# Patient Record
Sex: FEMALE | Race: WHITE | NOT HISPANIC OR LATINO
[De-identification: names, ages, dates, MRNs, and addresses within clinical notes are randomized per-mention and may not be internally consistent; named-entity substitution may affect disease eponyms.]

---

## 2021-03-12 PROBLEM — Z00.00 ENCOUNTER FOR PREVENTIVE HEALTH EXAMINATION: Status: ACTIVE | Noted: 2021-03-12

## 2021-03-17 DIAGNOSIS — I10 ESSENTIAL (PRIMARY) HYPERTENSION: ICD-10-CM

## 2021-03-17 DIAGNOSIS — Z86.39 PERSONAL HISTORY OF OTHER ENDOCRINE, NUTRITIONAL AND METABOLIC DISEASE: ICD-10-CM

## 2021-03-18 ENCOUNTER — APPOINTMENT (OUTPATIENT)
Dept: CARDIOTHORACIC SURGERY | Facility: CLINIC | Age: 86
End: 2021-03-18
Payer: MEDICARE

## 2021-03-18 DIAGNOSIS — G45.9 TRANSIENT CEREBRAL ISCHEMIC ATTACK, UNSPECIFIED: ICD-10-CM

## 2021-03-18 DIAGNOSIS — Z86.79 PERSONAL HISTORY OF OTHER DISEASES OF THE CIRCULATORY SYSTEM: ICD-10-CM

## 2021-03-18 DIAGNOSIS — Z78.9 OTHER SPECIFIED HEALTH STATUS: ICD-10-CM

## 2021-03-18 PROCEDURE — 99205 OFFICE O/P NEW HI 60 MIN: CPT

## 2021-03-19 PROBLEM — Z78.9 CAFFEINE USE: Status: ACTIVE | Noted: 2021-03-19

## 2021-03-19 PROBLEM — Z86.79 HISTORY OF PULMONARY HYPERTENSION: Status: RESOLVED | Noted: 2021-03-19 | Resolved: 2021-03-19

## 2021-03-19 PROBLEM — Z86.79 HISTORY OF MITRAL VALVE INSUFFICIENCY: Status: RESOLVED | Noted: 2021-03-19 | Resolved: 2021-03-19

## 2021-03-19 PROBLEM — Z86.79 HISTORY OF CARDIAC MURMUR: Status: RESOLVED | Noted: 2021-03-19 | Resolved: 2021-03-19

## 2021-03-19 PROBLEM — G45.9 TRANSIENT ISCHEMIC ATTACK (TIA): Status: RESOLVED | Noted: 2021-03-19 | Resolved: 2021-03-19

## 2021-03-19 NOTE — REVIEW OF SYSTEMS
[SOB on Exertion] : shortness of breath during exertion [Abdominal Pain] : abdominal pain [Fever] : no fever [Chills] : no chills [Feeling Poorly] : not feeling poorly [Feeling Tired] : feeling tired [Recent Weight Gain (___ Lbs)] : no recent weight gain [Recent Weight Loss (___ Lbs)] : no recent weight loss [Earache] : no earache [Loss Of Hearing] : hearing loss [Nosebleeds] : no nosebleeds [Nasal Discharge] : no nasal discharge [Sore Throat] : no sore throat [Hoarseness] : no hoarseness [Shortness Of Breath] : shortness of breath [Wheezing] : no wheezing [Cough] : no cough [Orthopnea] : no orthopnea [PND] : no PND [Arthralgias] : arthralgias [Joint Pain] : no joint pain [Joint Swelling] : no joint swelling [Joint Stiffness] : no joint stiffness [Limb Pain] : no limb pain [Limb Swelling] : no limb swelling [Negative] : Heme/Lymph [FreeTextEntry7] : gas pain

## 2021-03-19 NOTE — ASSESSMENT
[FreeTextEntry1] : I had the pleasure of evaluating  James Vazquez . Today I have  discussed (Echocardiogram Report from 2.4.21 from [facility] that revealed  a diagnosis Severe  Aortic Stenosis. Today she reports  shortness of breath with climbing up/down stairs or with exertion and gas pain that resolves on its own. She denies dizziness, unintentional weight loss, cough, fever or chills. \par \par Recent 2.4.21   Echocardiogram Report from [facility] revealed  a diagnosis Severe  Aortic Stenosis. Values from echocardiogram  report  showed a pGr 51 mmHg, mGr 28.4  mmHg,  JENNIFER VTI 0.41cm2, and Dimension index 0.13.  The patient's past medical history, past surgical history, family history, social history, allergies, medications, and multisystem review of systems were individually reviewed with the patient. \par \par The patient will need a full workup to confirm the severity of her aortic valve stenosis. I suspect she has low flow, low gradient severe aortic valve stenosis. Cardiac catheterization will be ordered with Dr. Briggs.  i asked him to cross the valve to obtain direct pressure gradients. While in the Cath Lab recovery room we will perform a repeat transthoracic echocardiogram, and carotid duplex. 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 Risks discussed included, but not limited to infection, bleeding, myocardial infarction, cerebrovascular accident, renal failure, vascular injury requiring intervention, cardiac rupture, and death. In addition, a roughly 5-10% risk of permanent pacemaker requirement was highlighted.\par \par \par \par PLAN: \par

## 2021-03-19 NOTE — PHYSICAL EXAM
[General Appearance - Alert] : alert [General Appearance - In No Acute Distress] : in no acute distress [Sclera] : the sclera and conjunctiva were normal [PERRL With Normal Accommodation] : pupils were equal in size, round, and reactive to light [Extraocular Movements] : extraocular movements were intact [Outer Ear] : the ears and nose were normal in appearance [Oropharynx] : the oropharynx was normal [Neck Appearance] : the appearance of the neck was normal [Neck Cervical Mass (___cm)] : no neck mass was observed [Jugular Venous Distention Increased] : there was no jugular-venous distention [Thyroid Diffuse Enlargement] : the thyroid was not enlarged [Thyroid Nodule] : there were no palpable thyroid nodules [Auscultation Breath Sounds / Voice Sounds] : lungs were clear to auscultation bilaterally [Apical Impulse] : the apical impulse was normal [Heart Rate And Rhythm] : heart rate was normal and rhythm regular [Heart Sounds Gallop] : no gallops [Heart Sounds Pericardial Friction Rub] : no pericardial rub [FreeTextEntry1] : Normal S1, absent S2, harsh systolic murmur cardiac base [Examination Of The Chest] : the chest was normal in appearance [Chest Visual Inspection Thoracic Asymmetry] : no chest asymmetry [Diminished Respiratory Excursion] : normal chest expansion [Varicose Veins Of The Right Leg] : the patient has no varicose veins of the right leg [Varicose Veins Of The Left Leg] : the patient has no varicose veins of the left leg [Bowel Sounds] : normal bowel sounds [Abdomen Soft] : soft [Abdomen Tenderness] : non-tender [Abdomen Mass (___ Cm)] : no abdominal mass palpated [Cervical Lymph Nodes Enlarged Posterior Bilaterally] : posterior cervical [Cervical Lymph Nodes Enlarged Anterior Bilaterally] : anterior cervical [Supraclavicular Lymph Nodes Enlarged Bilaterally] : supraclavicular [Axillary Lymph Nodes Enlarged Bilaterally] : axillary [Femoral Lymph Nodes Enlarged Bilaterally] : femoral [Inguinal Lymph Nodes Enlarged Bilaterally] : inguinal [No CVA Tenderness] : no ~M costovertebral angle tenderness [No Spinal Tenderness] : no spinal tenderness [Abnormal Walk] : normal gait [Nail Clubbing] : no clubbing  or cyanosis of the fingernails [Musculoskeletal - Swelling] : no joint swelling seen [Motor Tone] : muscle strength and tone were normal [Skin Color & Pigmentation] : normal skin color and pigmentation [Skin Turgor] : normal skin turgor [] : no rash [Deep Tendon Reflexes (DTR)] : deep tendon reflexes were 2+ and symmetric [Sensation] : the sensory exam was normal to light touch and pinprick [No Focal Deficits] : no focal deficits

## 2021-03-19 NOTE — DATA REVIEWED
[FreeTextEntry1] : \par 2.4.21   Echocardiogram Report from 2.4.21 from [facility] that revealed  a diagnosis Severe  Aortic Stenosis. Values from echocardiogram  report  showed a pGr 51 mmHg, mGr 28.4  mmHg, and JENNIFER VTI 0.41cm2, Dimension index 0.13\par \par -Normal global and regional LV systolic function \par -The LVEF 57% by Mod Reyna \par - Mild left ventricular basal septa; hypertrophy consistent  with a sigmoid septum \par -The left ventricular cavity size is decreased \par -Calcified mitral valve \par -Moderate mitral regurgitation \par -The aortic valve is trileaflet  calcified and has reduced cusp separation \par -There is severe aortic stenosis \par -The tricuspid valve is thickened \par -Mild to moderate tricuspid regurgitation \par -There is mild pulmonary  hypertension by right ventricular systolic pressure \par \par 12.7.2010 [facility] Nuclear Stress Test \par - Normal ejection fraction and no ischemia on SPECT imaging\par \par \par \par \par \par

## 2021-03-19 NOTE — HISTORY OF PRESENT ILLNESS
[FreeTextEntry1] : Ms. MENDOZA is a 89 year old female referred by  for an initial evaluation for a TAVR consult.  Her past medical history includes aortic stenosis, severe, benign essential hypertension,cardiac murmur, hyperlipidemia, mitral valve insufficiency, pulmonary hypertension,Transient ischemic attack (TIA), left breast cancer. \par \par Today she reports  shortness of breath with climbing up/down stairs or with exertion and gas pain that resolves on its own. She denies dizziness, unintentional weight loss, cough, fever or chills. Recent 2.4.21   Echocardiogram Report from   [facility] revealed  a diagnosis Severe  Aortic Stenosis. Values from echocardiogram  report  showed a pGr 51 mmHg, mGr 28.4  mmHg,  JENNIFER VTI 0.41cm2, and Dimension index 0.13.  She is here to discuss TAVR candidacy. \par \par The patient's past medical history, past surgical history, family history, social history, allergies, medications, and multisystem review of systems were individually reviewed with the patient.  There are no pertinent additions or subtractions.  The patient was personally seen and examined.\par \par

## 2021-03-19 NOTE — CONSULT LETTER
[Dear  ___] : Dear  [unfilled], [Consult Letter:] : I had the pleasure of evaluating your patient, [unfilled]. [Please see my note below.] : Please see my note below. [Consult Closing:] : Thank you very much for allowing me to participate in the care of this patient.  If you have any questions, please do not hesitate to contact me. [Sincerely,] : Sincerely, [FreeTextEntry2] : Dr. Briggs  [FreeTextEntry3] : Yasmany Jimenez MD\par Chief of Cardiovascular and Thoracic Surgery\par System Director of Endovascular and Cardiovascular Surgery\par  \par Cardiovascular and Thoracic Medicine\par [employer] [facility] [employer]\par [facility] \par [address] [place] [address]\[place] [address]\par T: [phone]\par F: [phone]\par \par

## 2021-04-27 ENCOUNTER — APPOINTMENT (OUTPATIENT)
Dept: DISASTER EMERGENCY | Facility: CLINIC | Age: 86
End: 2021-04-27

## 2021-05-11 ENCOUNTER — APPOINTMENT (OUTPATIENT)
Dept: DISASTER EMERGENCY | Facility: CLINIC | Age: 86
End: 2021-05-11

## 2021-05-12 LAB — SARS-COV-2 N GENE NPH QL NAA+PROBE: NOT DETECTED

## 2021-05-13 RX ORDER — CHLORHEXIDINE GLUCONATE 213 G/1000ML
1 SOLUTION TOPICAL ONCE
Refills: 0 | Status: DISCONTINUED | OUTPATIENT
Start: 2021-05-14 | End: 2021-05-28

## 2021-05-13 NOTE — H&P PST ADULT - ASSESSMENT
89 y/o F with PMH HTN, valvular disease(aortic and mitral) who is experiencing worsening KNUTSON. She presents today for Right and left heart catheterization with Dr Briggs for possible operative planning for TAVR    NPO for procedure  Consent to be obtained by MD prior to procedure   89 y/o F with PMH HTN, valvular disease(aortic and mitral) who is experiencing worsening KNUTSON. She presents today for Right and left heart catheterization with Dr Briggs for possible operative planning for TAVR    NPO for procedure  Consent to be obtained by MD prior to procedure  Pre procedure hydration

## 2021-05-13 NOTE — H&P PST ADULT - NSICDXPASTMEDICALHX_GEN_ALL_CORE_FT
PAST MEDICAL HISTORY:  Aortic stenosis     Breast cancer     HLD (hyperlipidemia)     HTN (hypertension)     Mitral regurgitation     TIA (transient ischemic attack)

## 2021-05-13 NOTE — H&P PST ADULT - HISTORY OF PRESENT ILLNESS
89 y/o F with PMH HTN, valvular disease(aortic and mitral) who is experiencing worsening KNUTSON. She presents today for Right and left heart catheterization with Dr Briggs for possible operative planning for TAVR      ASA  Mallampati  GFR  BRA  Cr  Covid 5/11/21 negative    Symptoms:        Angina (Class):        Ischemic Symptoms: KNUTSON    Heart Failure:        Systolic/Diastolic/Combined:        NYHA Class (within 2 weeks):     Assessment of LVEF (Must be within 6 months):       EF: 57%       Assessed by: Echo       Date: 2/4/21    Prior Cardiac Interventions (LHC, stents, CABG):       PCI's (Date, Stents, Vessels): NA       CABG (Date, Grafts): NA    Echo (Date, Findings): 2/4/21 normal LV function, mod MR, severe aortic stenosis, mild-mod TR, mild pulmonary HTM    Antianginal Therapies:        Beta Blockers:         Calcium Channel Blockers:        Long Acting Nitrates:        Ranexa:     Associated Risk Factors:        Cerebrovascular Disease: +TIA       Chronic Lung Disease: N/A       Peripheral Arterial Disease: N/A       Chronic Kidney Disease (if yes, what is GFR): N/A       Uncontrolled Diabetes (if yes, what is HgbA1C or FBS): N/A       Poorly Controlled Hypertension (if yes, what is SBP): N/A       Morbid Obesity (if yes, what is BMI): N/A       History of Recent Ventricular Arrhythmia: N/A       Inability to Ambulate Safely: N/A       Need for Therapeutic Anticoagulation: N/A       Antiplatelet or Contrast Allergy: N/A 89 y/o F with PMH HTN, valvular disease(aortic and mitral) who is experiencing worsening KNUTSON. She presents today for Right and left heart catheterization with Dr Briggs for possible operative planning for TAVR      ASA  2  Mallampati  2  GFR  BRA  Cr  Covid 5/11/21 negative    Symptoms:        Angina (Class): III       Ischemic Symptoms: KNUTSON    Heart Failure:        Systolic/Diastolic/Combined: NA       NYHA Class (within 2 weeks):     Assessment of LVEF (Must be within 6 months):       EF: 57%       Assessed by: Echo       Date: 2/4/21    Prior Cardiac Interventions (LHC, stents, CABG):       PCI's (Date, Stents, Vessels): NA       CABG (Date, Grafts): NA    Echo (Date, Findings): 2/4/21 normal LV function, mod MR, severe aortic stenosis, mild-mod TR, mild pulmonary HTM    Antianginal Therapies:        Beta Blockers:  Metoprolol 12.5 mg qd       Calcium Channel Blockers:        Long Acting Nitrates:        Ranexa:     Associated Risk Factors:        Cerebrovascular Disease: +TIA       Chronic Lung Disease: N/A       Peripheral Arterial Disease: N/A       Chronic Kidney Disease (if yes, what is GFR): N/A       Uncontrolled Diabetes (if yes, what is HgbA1C or FBS): N/A       Poorly Controlled Hypertension (if yes, what is SBP): N/A       Morbid Obesity (if yes, what is BMI): N/A       History of Recent Ventricular Arrhythmia: N/A       Inability to Ambulate Safely: N/A       Need for Therapeutic Anticoagulation: N/A       Antiplatelet or Contrast Allergy: N/A 89 y/o F with PMH HTN, valvular disease(aortic and mitral) who is experiencing worsening KNUTSON. She presents today for Right and left heart catheterization with Dr Birggs for possible operative planning for TAVR      ASA  2  Mallampati  2  GFR  66  BRA  3.6%  Cr  0.76  Covid 5/11/21 negative    Symptoms:        Angina (Class): III       Ischemic Symptoms: KNUTSON    Heart Failure:        Systolic/Diastolic/Combined: NA       NYHA Class (within 2 weeks):     Assessment of LVEF (Must be within 6 months):       EF: 57%       Assessed by: Echo       Date: 2/4/21    Prior Cardiac Interventions (LHC, stents, CABG):       PCI's (Date, Stents, Vessels): NA       CABG (Date, Grafts): NA    Echo (Date, Findings): 2/4/21 normal LV function, mod MR, severe aortic stenosis, mild-mod TR, mild pulmonary HTM    Antianginal Therapies:        Beta Blockers:  Metoprolol 12.5 mg qd       Calcium Channel Blockers:        Long Acting Nitrates:        Ranexa:     Associated Risk Factors:        Cerebrovascular Disease: +TIA       Chronic Lung Disease: N/A       Peripheral Arterial Disease: N/A       Chronic Kidney Disease (if yes, what is GFR): N/A       Uncontrolled Diabetes (if yes, what is HgbA1C or FBS): N/A       Poorly Controlled Hypertension (if yes, what is SBP): N/A       Morbid Obesity (if yes, what is BMI): N/A       History of Recent Ventricular Arrhythmia: N/A       Inability to Ambulate Safely: N/A       Need for Therapeutic Anticoagulation: N/A       Antiplatelet or Contrast Allergy: N/A

## 2021-05-14 ENCOUNTER — TRANSCRIPTION ENCOUNTER (OUTPATIENT)
Age: 86
End: 2021-05-14

## 2021-05-14 ENCOUNTER — OUTPATIENT (OUTPATIENT)
Dept: OUTPATIENT SERVICES | Facility: HOSPITAL | Age: 86
LOS: 1 days | Discharge: HOME | End: 2021-05-14
Payer: MEDICARE

## 2021-05-14 VITALS
HEART RATE: 71 BPM | TEMPERATURE: 98 F | RESPIRATION RATE: 16 BRPM | OXYGEN SATURATION: 97 % | HEIGHT: 62 IN | DIASTOLIC BLOOD PRESSURE: 79 MMHG | WEIGHT: 104.06 LBS | SYSTOLIC BLOOD PRESSURE: 157 MMHG

## 2021-05-14 VITALS
RESPIRATION RATE: 15 BRPM | HEART RATE: 74 BPM | OXYGEN SATURATION: 94 % | SYSTOLIC BLOOD PRESSURE: 139 MMHG | DIASTOLIC BLOOD PRESSURE: 65 MMHG

## 2021-05-14 DIAGNOSIS — Z90.12 ACQUIRED ABSENCE OF LEFT BREAST AND NIPPLE: Chronic | ICD-10-CM

## 2021-05-14 DIAGNOSIS — I35.0 NONRHEUMATIC AORTIC (VALVE) STENOSIS: ICD-10-CM

## 2021-05-14 LAB
ANION GAP SERPL CALC-SCNC: 10 MMOL/L — SIGNIFICANT CHANGE UP (ref 5–17)
BASOPHILS # BLD AUTO: 0.04 K/UL — SIGNIFICANT CHANGE UP (ref 0–0.2)
BASOPHILS NFR BLD AUTO: 0.4 % — SIGNIFICANT CHANGE UP (ref 0–2)
BUN SERPL-MCNC: 21 MG/DL — HIGH (ref 8–20)
CALCIUM SERPL-MCNC: 10 MG/DL — SIGNIFICANT CHANGE UP (ref 8.6–10.2)
CHLORIDE SERPL-SCNC: 103 MMOL/L — SIGNIFICANT CHANGE UP (ref 98–107)
CO2 SERPL-SCNC: 25 MMOL/L — SIGNIFICANT CHANGE UP (ref 22–29)
CREAT SERPL-MCNC: 0.76 MG/DL — SIGNIFICANT CHANGE UP (ref 0.5–1.3)
EOSINOPHIL # BLD AUTO: 0.04 K/UL — SIGNIFICANT CHANGE UP (ref 0–0.5)
EOSINOPHIL NFR BLD AUTO: 0.4 % — SIGNIFICANT CHANGE UP (ref 0–6)
GLUCOSE SERPL-MCNC: 121 MG/DL — HIGH (ref 70–99)
HCT VFR BLD CALC: 37.9 % — SIGNIFICANT CHANGE UP (ref 34.5–45)
HGB BLD-MCNC: 12.7 G/DL — SIGNIFICANT CHANGE UP (ref 11.5–15.5)
IMM GRANULOCYTES NFR BLD AUTO: 0.4 % — SIGNIFICANT CHANGE UP (ref 0–1.5)
LYMPHOCYTES # BLD AUTO: 2.78 K/UL — SIGNIFICANT CHANGE UP (ref 1–3.3)
LYMPHOCYTES # BLD AUTO: 28.7 % — SIGNIFICANT CHANGE UP (ref 13–44)
MCHC RBC-ENTMCNC: 28.7 PG — SIGNIFICANT CHANGE UP (ref 27–34)
MCHC RBC-ENTMCNC: 33.5 GM/DL — SIGNIFICANT CHANGE UP (ref 32–36)
MCV RBC AUTO: 85.7 FL — SIGNIFICANT CHANGE UP (ref 80–100)
MONOCYTES # BLD AUTO: 0.8 K/UL — SIGNIFICANT CHANGE UP (ref 0–0.9)
MONOCYTES NFR BLD AUTO: 8.3 % — SIGNIFICANT CHANGE UP (ref 2–14)
NEUTROPHILS # BLD AUTO: 5.99 K/UL — SIGNIFICANT CHANGE UP (ref 1.8–7.4)
NEUTROPHILS NFR BLD AUTO: 61.8 % — SIGNIFICANT CHANGE UP (ref 43–77)
PLATELET # BLD AUTO: 261 K/UL — SIGNIFICANT CHANGE UP (ref 150–400)
POTASSIUM SERPL-MCNC: 4.5 MMOL/L — SIGNIFICANT CHANGE UP (ref 3.5–5.3)
POTASSIUM SERPL-SCNC: 4.5 MMOL/L — SIGNIFICANT CHANGE UP (ref 3.5–5.3)
RBC # BLD: 4.42 M/UL — SIGNIFICANT CHANGE UP (ref 3.8–5.2)
RBC # FLD: 13.6 % — SIGNIFICANT CHANGE UP (ref 10.3–14.5)
SODIUM SERPL-SCNC: 138 MMOL/L — SIGNIFICANT CHANGE UP (ref 135–145)
WBC # BLD: 9.69 K/UL — SIGNIFICANT CHANGE UP (ref 3.8–10.5)
WBC # FLD AUTO: 9.69 K/UL — SIGNIFICANT CHANGE UP (ref 3.8–10.5)

## 2021-05-14 PROCEDURE — C1769: CPT

## 2021-05-14 PROCEDURE — 85025 COMPLETE CBC W/AUTO DIFF WBC: CPT

## 2021-05-14 PROCEDURE — 93458 L HRT ARTERY/VENTRICLE ANGIO: CPT

## 2021-05-14 PROCEDURE — 99152 MOD SED SAME PHYS/QHP 5/>YRS: CPT

## 2021-05-14 PROCEDURE — 93010 ELECTROCARDIOGRAM REPORT: CPT

## 2021-05-14 PROCEDURE — 99153 MOD SED SAME PHYS/QHP EA: CPT

## 2021-05-14 PROCEDURE — 36415 COLL VENOUS BLD VENIPUNCTURE: CPT

## 2021-05-14 PROCEDURE — 93005 ELECTROCARDIOGRAM TRACING: CPT

## 2021-05-14 PROCEDURE — C1894: CPT

## 2021-05-14 PROCEDURE — C1887: CPT

## 2021-05-14 PROCEDURE — 80048 BASIC METABOLIC PNL TOTAL CA: CPT

## 2021-05-14 PROCEDURE — C1889: CPT

## 2021-05-14 RX ORDER — SODIUM CHLORIDE 9 MG/ML
1000 INJECTION INTRAMUSCULAR; INTRAVENOUS; SUBCUTANEOUS
Refills: 0 | Status: DISCONTINUED | OUTPATIENT
Start: 2021-05-14 | End: 2021-05-28

## 2021-05-14 RX ORDER — METOPROLOL TARTRATE 50 MG
1 TABLET ORAL
Qty: 30 | Refills: 1
Start: 2021-05-14

## 2021-05-14 RX ADMIN — SODIUM CHLORIDE 300 MILLILITER(S): 9 INJECTION INTRAMUSCULAR; INTRAVENOUS; SUBCUTANEOUS at 08:32

## 2021-05-14 RX ADMIN — SODIUM CHLORIDE 200 MILLILITER(S): 9 INJECTION INTRAMUSCULAR; INTRAVENOUS; SUBCUTANEOUS at 11:02

## 2021-05-14 NOTE — ASU PATIENT PROFILE, ADULT - PMH
Aortic stenosis    Breast cancer    HLD (hyperlipidemia)    HTN (hypertension)    Mitral regurgitation    TIA (transient ischemic attack)

## 2021-05-14 NOTE — DISCHARGE NOTE PROVIDER - CARE PROVIDER_API CALL
Jayce Baird  [id number]  [address]  [place] [address]  Phone: ([phone]  Fax: ()-  Follow Up Time:     Yasmany Jimenez)  Surgery; Thoracic and Cardiac Surgery  [address] [place] [address]  Phone: [phone]  Fax: [phone]  Follow Up Time:

## 2021-05-14 NOTE — PROGRESS NOTE ADULT - SUBJECTIVE AND OBJECTIVE BOX
Department of Cardiology                                                                  [facility]/[place] [address] [address] [facility]                                                            Telephone: [phone]. Fax:[phone]                                                    Post- Procedure Note: Left Heart Cardiac Catheterization       HPI:  89 y/o F with PMH HTN, valvular disease(aortic and mitral) who is experiencing worsening KNUTSON. She presents today for Right and left heart catheterization with Dr Briggs for possible operative planning for TAVR  Now s/p right and left heart catheterization via right radial artery and right femoral vein approach with Dr. Briggs:RCA and LAD disease with critical AS, PCWP 20.(Verbal report)  Pt tolerated procedure well    Medications:  Versed 0.5 mg  Fentanyl 25 mcg  Heparin 4000 units         PAST MEDICAL & SURGICAL HISTORY:  HTN (hypertension)    HLD (hyperlipidemia)    TIA (transient ischemic attack)    Aortic stenosis    Mitral regurgitation    Breast cancer    H/O left mastectomy      FAMILY HISTORY:    Home Medications:  clopidogrel 75 mg oral tablet: 1 tab(s) orally once a day (14 May 2021 08:24)  Metoprolol Tartrate 25 mg oral tablet: 1/2  tab(s) orally 2 times a day (14 May 2021 08:24)  simvastatin 20 mg oral tablet: 1 tab(s) orally once a day (at bedtime) (14 May 2021 08:24)    Objective:  Vital Signs Last 24 Hrs  T(C): 36.5 (14 May 2021 08:25), Max: 36.5 (14 May 2021 08:25)  T(F): 97.7 (14 May 2021 08:25), Max: 97.7 (14 May 2021 08:25)  HR: 71 (14 May 2021 08:25) (71 - 71)  BP: 157/79 (14 May 2021 08:25) (157/79 - 157/79)  BP(mean): --  RR: 16 (14 May 2021 08:25) (16 - 16)  SpO2: 97% (14 May 2021 08:25) (97% - 97%)    CM: SR  Neuro: A&OX3, CN 2-12 intact  HEENT: NC, AT  Lungs: CTA B/L  CV: S1, S2, no murmur, RRR  Abd: Soft  Right Groin with #4Fr V sheath: Soft, no bleeding, no hematoma; R radial band in place; soft, no bleeding, no hematoma. R hand without motor or sensory deficits  Extremity: + distal pulses                        12.7   9.69  )-----------( 261      ( 14 May 2021 08:19 )             37.9     05-14    138  |  103  |  21.0<H>  ----------------------------<  121<H>  4.5   |  25.0  |  0.76    Ca    10.0      14 May 2021 08:19      89 y/o F with PMH HTN, valvular disease(aortic and mitral) who is experiencing worsening KNUTSON. She presents today for Right and left heart catheterization with Dr Briggs for possible operative planning for TAVR  Now s/p right and left heart catheterization via right radial artery and right femoral vein approach with Dr. Briggs:RCA and LAD disease with critical AS, PCWP 20.(Verbal report)  Pt tolerated procedure well    -post cardiac cath orders  -radial and groin precautions  -bedrest x 2  hours post procedure  -Post procedure hydration  -continue plavix   -statin therapy  -change beta blocker to Toprol 25 mg qhs per Dr Briggs  -follow up outpt in 2 weeks with Cardiologist(Dr Baidr) and CTS(Dr Jimenez)  -Discharge later today

## 2021-05-14 NOTE — DISCHARGE NOTE PROVIDER - HOSPITAL COURSE
89 y/o F with PMH HTN, valvular disease(aortic and mitral) who is experiencing worsening KNUTSON. She presents today for Right and left heart catheterization with Dr Briggs for possible operative planning for TAVR  Now s/p right and left heart catheterization via right radial artery and right femoral vein approach with Dr. Briggs:RCA and LAD disease with critical AS, PCWP 20.(Verbal report)  Pt tolerated procedure well  Pt ambulating, voided and tolerated diet.  She is stable for discharge

## 2021-05-14 NOTE — DISCHARGE NOTE NURSING/CASE MANAGEMENT/SOCIAL WORK - PATIENT PORTAL LINK FT
You can access the FollowMyHealth Patient Portal offered by [facility] by registering at the following website: [email or web address]. By joining [employer]’s FollowMyHealth portal, you will also be able to view your health information using other applications (apps) compatible with our system.

## 2021-05-14 NOTE — DISCHARGE NOTE PROVIDER - NSDCMRMEDTOKEN_GEN_ALL_CORE_FT
clopidogrel 75 mg oral tablet: 1 tab(s) orally once a day  metoprolol succinate 25 mg oral capsule, extended release: 1 tab(s) orally once a day (at bedtime)  simvastatin 20 mg oral tablet: 1 tab(s) orally once a day (at bedtime)

## 2021-05-14 NOTE — DISCHARGE NOTE PROVIDER - NSDCCPCAREPLAN_GEN_ALL_CORE_FT
PRINCIPAL DISCHARGE DIAGNOSIS  Diagnosis: Aortic stenosis  Assessment and Plan of Treatment:       SECONDARY DISCHARGE DIAGNOSES  Diagnosis: CAD (coronary atherosclerotic disease)  Assessment and Plan of Treatment:     Diagnosis: HLD (hyperlipidemia)  Assessment and Plan of Treatment:

## 2021-05-14 NOTE — DISCHARGE NOTE PROVIDER - NSDCCPTREATMENT_GEN_ALL_CORE_FT
PRINCIPAL PROCEDURE  Procedure: Left and right heart catheterization  Findings and Treatment: RCA and LAD disease with severe aortic stenosis

## 2021-06-05 PROBLEM — I34.0 NONRHEUMATIC MITRAL (VALVE) INSUFFICIENCY: Chronic | Status: ACTIVE | Noted: 2021-05-13

## 2021-06-05 PROBLEM — I10 ESSENTIAL (PRIMARY) HYPERTENSION: Chronic | Status: ACTIVE | Noted: 2021-05-13

## 2021-06-05 PROBLEM — E78.5 HYPERLIPIDEMIA, UNSPECIFIED: Chronic | Status: ACTIVE | Noted: 2021-05-13

## 2021-06-05 PROBLEM — G45.9 TRANSIENT CEREBRAL ISCHEMIC ATTACK, UNSPECIFIED: Chronic | Status: ACTIVE | Noted: 2021-05-13

## 2021-06-05 PROBLEM — I35.0 NONRHEUMATIC AORTIC (VALVE) STENOSIS: Chronic | Status: ACTIVE | Noted: 2021-05-13

## 2021-06-22 ENCOUNTER — OUTPATIENT (OUTPATIENT)
Dept: OUTPATIENT SERVICES | Facility: HOSPITAL | Age: 86
LOS: 1 days | End: 2021-06-22
Payer: MEDICARE

## 2021-06-22 ENCOUNTER — RESULT REVIEW (OUTPATIENT)
Age: 86
End: 2021-06-22

## 2021-06-22 DIAGNOSIS — Z90.12 ACQUIRED ABSENCE OF LEFT BREAST AND NIPPLE: Chronic | ICD-10-CM

## 2021-06-22 DIAGNOSIS — I35.0 NONRHEUMATIC AORTIC (VALVE) STENOSIS: ICD-10-CM

## 2021-06-22 PROCEDURE — 74174 CTA ABD&PLVS W/CONTRAST: CPT

## 2021-06-22 PROCEDURE — 93880 EXTRACRANIAL BILAT STUDY: CPT | Mod: 26

## 2021-06-22 PROCEDURE — 93306 TTE W/DOPPLER COMPLETE: CPT

## 2021-06-22 PROCEDURE — 74174 CTA ABD&PLVS W/CONTRAST: CPT | Mod: 26,MH

## 2021-06-22 PROCEDURE — 75574 CT ANGIO HRT W/3D IMAGE: CPT

## 2021-06-22 PROCEDURE — 75574 CT ANGIO HRT W/3D IMAGE: CPT | Mod: 26,MH

## 2021-06-22 PROCEDURE — 93306 TTE W/DOPPLER COMPLETE: CPT | Mod: 26

## 2021-06-22 PROCEDURE — 93880 EXTRACRANIAL BILAT STUDY: CPT

## 2021-06-30 DIAGNOSIS — Z23 ENCOUNTER FOR IMMUNIZATION: ICD-10-CM

## 2021-07-01 ENCOUNTER — NON-APPOINTMENT (OUTPATIENT)
Age: 86
End: 2021-07-01

## 2021-07-08 ENCOUNTER — OUTPATIENT (OUTPATIENT)
Dept: OUTPATIENT SERVICES | Facility: HOSPITAL | Age: 86
LOS: 1 days | End: 2021-07-08
Payer: MEDICARE

## 2021-07-08 ENCOUNTER — RESULT REVIEW (OUTPATIENT)
Age: 86
End: 2021-07-08

## 2021-07-08 VITALS
HEIGHT: 61 IN | SYSTOLIC BLOOD PRESSURE: 140 MMHG | WEIGHT: 106.7 LBS | RESPIRATION RATE: 16 BRPM | HEART RATE: 68 BPM | DIASTOLIC BLOOD PRESSURE: 82 MMHG | TEMPERATURE: 98 F

## 2021-07-08 DIAGNOSIS — Z01.818 ENCOUNTER FOR OTHER PREPROCEDURAL EXAMINATION: ICD-10-CM

## 2021-07-08 DIAGNOSIS — Z98.49 CATARACT EXTRACTION STATUS, UNSPECIFIED EYE: Chronic | ICD-10-CM

## 2021-07-08 DIAGNOSIS — Z29.9 ENCOUNTER FOR PROPHYLACTIC MEASURES, UNSPECIFIED: ICD-10-CM

## 2021-07-08 DIAGNOSIS — E78.5 HYPERLIPIDEMIA, UNSPECIFIED: ICD-10-CM

## 2021-07-08 DIAGNOSIS — Z90.12 ACQUIRED ABSENCE OF LEFT BREAST AND NIPPLE: Chronic | ICD-10-CM

## 2021-07-08 DIAGNOSIS — I35.0 NONRHEUMATIC AORTIC (VALVE) STENOSIS: ICD-10-CM

## 2021-07-08 DIAGNOSIS — I10 ESSENTIAL (PRIMARY) HYPERTENSION: ICD-10-CM

## 2021-07-08 LAB
A1C WITH ESTIMATED AVERAGE GLUCOSE RESULT: 5.9 % — HIGH (ref 4–5.6)
ALBUMIN SERPL ELPH-MCNC: 4.3 G/DL — SIGNIFICANT CHANGE UP (ref 3.3–5.2)
ALP SERPL-CCNC: 108 U/L — SIGNIFICANT CHANGE UP (ref 40–120)
ALT FLD-CCNC: 22 U/L — SIGNIFICANT CHANGE UP
ANION GAP SERPL CALC-SCNC: 13 MMOL/L — SIGNIFICANT CHANGE UP (ref 5–17)
APPEARANCE UR: CLEAR — SIGNIFICANT CHANGE UP
APTT BLD: 30 SEC — SIGNIFICANT CHANGE UP (ref 27.5–35.5)
AST SERPL-CCNC: 24 U/L — SIGNIFICANT CHANGE UP
BACTERIA # UR AUTO: ABNORMAL
BASOPHILS # BLD AUTO: 0.03 K/UL — SIGNIFICANT CHANGE UP (ref 0–0.2)
BASOPHILS NFR BLD AUTO: 0.4 % — SIGNIFICANT CHANGE UP (ref 0–2)
BILIRUB SERPL-MCNC: 0.4 MG/DL — SIGNIFICANT CHANGE UP (ref 0.4–2)
BILIRUB UR-MCNC: NEGATIVE — SIGNIFICANT CHANGE UP
BLD GP AB SCN SERPL QL: SIGNIFICANT CHANGE UP
BUN SERPL-MCNC: 19.4 MG/DL — SIGNIFICANT CHANGE UP (ref 8–20)
CALCIUM SERPL-MCNC: 9.8 MG/DL — SIGNIFICANT CHANGE UP (ref 8.6–10.2)
CHLORIDE SERPL-SCNC: 100 MMOL/L — SIGNIFICANT CHANGE UP (ref 98–107)
CO2 SERPL-SCNC: 27 MMOL/L — SIGNIFICANT CHANGE UP (ref 22–29)
COLOR SPEC: YELLOW — SIGNIFICANT CHANGE UP
CREAT SERPL-MCNC: 0.72 MG/DL — SIGNIFICANT CHANGE UP (ref 0.5–1.3)
DIFF PNL FLD: ABNORMAL
EOSINOPHIL # BLD AUTO: 0.01 K/UL — SIGNIFICANT CHANGE UP (ref 0–0.5)
EOSINOPHIL NFR BLD AUTO: 0.1 % — SIGNIFICANT CHANGE UP (ref 0–6)
EPI CELLS # UR: SIGNIFICANT CHANGE UP
ESTIMATED AVERAGE GLUCOSE: 123 MG/DL — HIGH (ref 68–114)
GLUCOSE SERPL-MCNC: 100 MG/DL — HIGH (ref 70–99)
GLUCOSE UR QL: NEGATIVE MG/DL — SIGNIFICANT CHANGE UP
HCT VFR BLD CALC: 39.5 % — SIGNIFICANT CHANGE UP (ref 34.5–45)
HGB BLD-MCNC: 12.8 G/DL — SIGNIFICANT CHANGE UP (ref 11.5–15.5)
IMM GRANULOCYTES NFR BLD AUTO: 0.4 % — SIGNIFICANT CHANGE UP (ref 0–1.5)
INR BLD: 1.06 RATIO — SIGNIFICANT CHANGE UP (ref 0.88–1.16)
KETONES UR-MCNC: NEGATIVE — SIGNIFICANT CHANGE UP
LEUKOCYTE ESTERASE UR-ACNC: NEGATIVE — SIGNIFICANT CHANGE UP
LYMPHOCYTES # BLD AUTO: 1.35 K/UL — SIGNIFICANT CHANGE UP (ref 1–3.3)
LYMPHOCYTES # BLD AUTO: 18 % — SIGNIFICANT CHANGE UP (ref 13–44)
MCHC RBC-ENTMCNC: 28.4 PG — SIGNIFICANT CHANGE UP (ref 27–34)
MCHC RBC-ENTMCNC: 32.4 GM/DL — SIGNIFICANT CHANGE UP (ref 32–36)
MCV RBC AUTO: 87.8 FL — SIGNIFICANT CHANGE UP (ref 80–100)
MONOCYTES # BLD AUTO: 0.58 K/UL — SIGNIFICANT CHANGE UP (ref 0–0.9)
MONOCYTES NFR BLD AUTO: 7.7 % — SIGNIFICANT CHANGE UP (ref 2–14)
MRSA PCR RESULT.: SIGNIFICANT CHANGE UP
NEUTROPHILS # BLD AUTO: 5.52 K/UL — SIGNIFICANT CHANGE UP (ref 1.8–7.4)
NEUTROPHILS NFR BLD AUTO: 73.4 % — SIGNIFICANT CHANGE UP (ref 43–77)
NITRITE UR-MCNC: NEGATIVE — SIGNIFICANT CHANGE UP
NT-PROBNP SERPL-SCNC: 1586 PG/ML — HIGH (ref 0–300)
PH UR: 7 — SIGNIFICANT CHANGE UP (ref 5–8)
PLATELET # BLD AUTO: 224 K/UL — SIGNIFICANT CHANGE UP (ref 150–400)
POTASSIUM SERPL-MCNC: 4.4 MMOL/L — SIGNIFICANT CHANGE UP (ref 3.5–5.3)
POTASSIUM SERPL-SCNC: 4.4 MMOL/L — SIGNIFICANT CHANGE UP (ref 3.5–5.3)
PREALB SERPL-MCNC: 21 MG/DL — SIGNIFICANT CHANGE UP (ref 18–38)
PROT SERPL-MCNC: 7.6 G/DL — SIGNIFICANT CHANGE UP (ref 6.6–8.7)
PROT UR-MCNC: 15 MG/DL
PROTHROM AB SERPL-ACNC: 12.3 SEC — SIGNIFICANT CHANGE UP (ref 10.6–13.6)
RBC # BLD: 4.5 M/UL — SIGNIFICANT CHANGE UP (ref 3.8–5.2)
RBC # FLD: 13.6 % — SIGNIFICANT CHANGE UP (ref 10.3–14.5)
RBC CASTS # UR COMP ASSIST: SIGNIFICANT CHANGE UP /HPF (ref 0–4)
S AUREUS DNA NOSE QL NAA+PROBE: DETECTED
SODIUM SERPL-SCNC: 140 MMOL/L — SIGNIFICANT CHANGE UP (ref 135–145)
SP GR SPEC: 1 — LOW (ref 1.01–1.02)
T3 SERPL-MCNC: 87 NG/DL — SIGNIFICANT CHANGE UP (ref 80–200)
T4 AB SER-ACNC: 7.9 UG/DL — SIGNIFICANT CHANGE UP (ref 4.5–12)
TSH SERPL-MCNC: 1.45 UIU/ML — SIGNIFICANT CHANGE UP (ref 0.27–4.2)
UROBILINOGEN FLD QL: NEGATIVE MG/DL — SIGNIFICANT CHANGE UP
WBC # BLD: 7.52 K/UL — SIGNIFICANT CHANGE UP (ref 3.8–10.5)
WBC # FLD AUTO: 7.52 K/UL — SIGNIFICANT CHANGE UP (ref 3.8–10.5)
WBC UR QL: SIGNIFICANT CHANGE UP

## 2021-07-08 PROCEDURE — 93010 ELECTROCARDIOGRAM REPORT: CPT

## 2021-07-08 PROCEDURE — 71046 X-RAY EXAM CHEST 2 VIEWS: CPT | Mod: 26

## 2021-07-08 RX ORDER — SODIUM CHLORIDE 9 MG/ML
3 INJECTION INTRAMUSCULAR; INTRAVENOUS; SUBCUTANEOUS EVERY 8 HOURS
Refills: 0 | Status: DISCONTINUED | OUTPATIENT
Start: 2021-07-14 | End: 2021-07-20

## 2021-07-08 RX ORDER — CEFUROXIME AXETIL 250 MG
1500 TABLET ORAL ONCE
Refills: 0 | Status: DISCONTINUED | OUTPATIENT
Start: 2021-07-14 | End: 2021-07-15

## 2021-07-08 NOTE — H&P PST ADULT - NSICDXPASTMEDICALHX_GEN_ALL_CORE_FT
PAST MEDICAL HISTORY:  Aortic stenosis     Breast cancer     HLD (hyperlipidemia)     HTN (hypertension)     Mitral regurgitation     TIA (transient ischemic attack)      PAST MEDICAL HISTORY:  Aortic stenosis     Breast cancer 1979, just surgery, no chemo or radiation.    HLD (hyperlipidemia)     HTN (hypertension)     Mitral regurgitation     TIA (transient ischemic attack)

## 2021-07-08 NOTE — H&P PST ADULT - HISTORY OF PRESENT ILLNESS
89 y/o F with PMH HTN, valvular disease(aortic and mitral) who is experiencing worsening KNUTSON. She presents today for Right and left heart catheterization with Dr Briggs for possible operative planning for TAVR      ASA  2  Mallampati  2  GFR  66  BRA  3.6%  Cr  0.76  Covid 5/11/21 negative    Symptoms:        Angina (Class): III       Ischemic Symptoms: KNUTSON    Heart Failure:        Systolic/Diastolic/Combined: NA       NYHA Class (within 2 weeks):     Assessment of LVEF (Must be within 6 months):       EF: 57%       Assessed by: Echo       Date: 2/4/21    Prior Cardiac Interventions (LHC, stents, CABG):       PCI's (Date, Stents, Vessels): NA       CABG (Date, Grafts): NA    Echo (Date, Findings): 2/4/21 normal LV function, mod MR, severe aortic stenosis, mild-mod TR, mild pulmonary HTM    Antianginal Therapies:        Beta Blockers:  Metoprolol 12.5 mg qd       Calcium Channel Blockers:        Long Acting Nitrates:        Ranexa:     Associated Risk Factors:        Cerebrovascular Disease: +TIA       Chronic Lung Disease: N/A       Peripheral Arterial Disease: N/A       Chronic Kidney Disease (if yes, what is GFR): N/A       Uncontrolled Diabetes (if yes, what is HgbA1C or FBS): N/A       Poorly Controlled Hypertension (if yes, what is SBP): N/A       Morbid Obesity (if yes, what is BMI): N/A       History of Recent Ventricular Arrhythmia: N/A       Inability to Ambulate Safely: N/A       Need for Therapeutic Anticoagulation: N/A       Antiplatelet or Contrast Allergy: N/A 89 y/o F with PMH HTN, valvular disease(aortic and mitral) who is scheduled for TAVR,  is here accompanied by her daughter , she said she is been experiencing worsening KNUTSON.   Symptoms:        Angina (Class): III       Ischemic Symptoms: KNUTSON    Heart Failure:        Systolic/Diastolic/Combined: NA       NYHA Class (within 2 weeks):     Assessment of LVEF (Must be within 6 months):       EF: 57%       Assessed by: Echo       Date: 2/4/21    Prior Cardiac Interventions (LHC, stents, CABG):       PCI's (Date, Stents, Vessels): NA       CABG (Date, Grafts): NA    Echo (Date, Findings): 2/4/21 normal LV function, mod MR, severe aortic stenosis, mild-mod TR, mild pulmonary HTM    Antianginal Therapies:        Beta Blockers:  Metoprolol 12.5 mg qd       Calcium Channel Blockers:        Long Acting Nitrates:        Ranexa:     Associated Risk Factors:        Cerebrovascular Disease: +TIA       Chronic Lung Disease: N/A       Peripheral Arterial Disease: N/A       Chronic Kidney Disease (if yes, what is GFR): N/A       Uncontrolled Diabetes (if yes, what is HgbA1C or FBS): N/A       Poorly Controlled Hypertension (if yes, what is SBP): N/A       Morbid Obesity (if yes, what is BMI): N/A       History of Recent Ventricular Arrhythmia: N/A       Inability to Ambulate Safely: N/A       Need for Therapeutic Anticoagulation: N/A       Antiplatelet or Contrast Allergy: N/A

## 2021-07-08 NOTE — H&P PST ADULT - NSICDXPROBLEM_GEN_ALL_CORE_FT
PROBLEM DIAGNOSES  Problem: HTN (hypertension)  Assessment and Plan: continue medications as instructed. Asked the patient to take the Blood pressure medication/ heart medication on DOP. metoprolol    Problem: Aortic stenosis  Assessment and Plan: TAVR    Problem: HLD (hyperlipidemia)  Assessment and Plan: continue medications as instructed.     Problem: Need for prophylactic measure  Assessment and Plan: Caprini Score 7 High risk,  Surgical team should assess /Strongly recommend pharmacological and mechanical measures for VTE prophylaxis

## 2021-07-08 NOTE — H&P PST ADULT - NSICDXPASTSURGICALHX_GEN_ALL_CORE_FT
PAST SURGICAL HISTORY:  H/O left mastectomy      PAST SURGICAL HISTORY:  H/O cataract extraction     H/O left mastectomy

## 2021-07-08 NOTE — H&P PST ADULT - ASSESSMENT
CAPRINI VTE 2.0 SCORE [CLOT updated 2019]    AGE RELATED RISK FACTORS                                                       MOBILITY RELATED FACTORS  [ ] Age 41-60 years                                            (1 Point)                    [ ] Bed rest                                                        (1 Point)  [ ] Age: 61-74 years                                           (2 Points)                  [ ] Plaster cast                                                   (2 Points)  [ ] Age= 75 years                                              (3 Points)                    [ ] Bed bound for more than 72 hours                 (2 Points)    DISEASE RELATED RISK FACTORS                                               GENDER SPECIFIC FACTORS  [ ] Edema in the lower extremities                       (1 Point)              [ ] Pregnancy                                                     (1 Point)  [ ] Varicose veins                                               (1 Point)                     [ ] Post-partum < 6 weeks                                   (1 Point)             [ ] BMI > 25 Kg/m2                                            (1 Point)                     [ ] Hormonal therapy  or oral contraception          (1 Point)                 [ ] Sepsis (in the previous month)                        (1 Point)               [ ] History of pregnancy complications                 (1 point)  [ ] Pneumonia or serious lung disease                                               [ ] Unexplained or recurrent                     (1 Point)           (in the previous month)                               (1 Point)  [ ] Abnormal pulmonary function test                     (1 Point)                 SURGERY RELATED RISK FACTORS  [ ] Acute myocardial infarction                              (1 Point)               [ ]  Section                                             (1 Point)  [ ] Congestive heart failure (in the previous month)  (1 Point)      [ ] Minor surgery                                                  (1 Point)   [ ] Inflammatory bowel disease                             (1 Point)               [ ] Arthroscopic surgery                                        (2 Points)  [ ] Central venous access                                      (2 Points)                [ ] General surgery lasting more than 45 minutes (2 points)  [ ] Malignancy- Present or previous                   (2 Points)                [ ] Elective arthroplasty                                         (5 points)    [ ] Stroke (in the previous month)                          (5 Points)                                                                                                                                                           HEMATOLOGY RELATED FACTORS                                                 TRAUMA RELATED RISK FACTORS  [ ] Prior episodes of VTE                                     (3 Points)                [ ] Fracture of the hip, pelvis, or leg                       (5 Points)  [ ] Positive family history for VTE                         (3 Points)             [ ] Acute spinal cord injury (in the previous month)  (5 Points)  [ ] Prothrombin 74365 A                                     (3 Points)               [ ] Paralysis  (less than 1 month)                             (5 Points)  [ ] Factor V Leiden                                             (3 Points)                  [ ] Multiple Trauma within 1 month                        (5 Points)  [ ] Lupus anticoagulants                                     (3 Points)                                                           [ ] Anticardiolipin antibodies                               (3 Points)                                                       [ ] High homocysteine in the blood                      (3 Points)                                             [ ] Other congenital or acquired thrombophilia      (3 Points)                                                [ ] Heparin induced thrombocytopenia                  (3 Points)                                     Total Score [          ]    OPIOID RISK TOOL    EDVIN EACH BOX THAT APPLIES AND ADD TOTALS AT THE END    FAMILY HISTORY OF SUBSTANCE ABUSE                 FEMALE         MALE                                                Alcohol                             [  ]1 pt          [  ]3pts                                               Illegal Drugs                     [  ]2 pts        [  ]3pts                                               Rx Drugs                           [  ]4 pts        [  ]4 pts    PERSONAL HISTORY OF SUBSTANCE ABUSE                                                                                          Alcohol                             [  ]3 pts       [  ]3 pts                                               Illegal Drugs                     [  ]4 pts        [  ]4 pts                                               Rx Drugs                           [  ]5 pts        [  ]5 pts    AGE BETWEEN 16-45 YEARS                                      [  ]1 pt         [  ]1 pt    HISTORY OF PREADOLESCENT   SEXUAL ABUSE                                                             [  ]3 pts        [  ]0pts    PSYCHOLOGICAL DISEASE                     ADD, OCD, Bipolar, Schizophrenia        [  ]2 pts         [  ]2 pts                      Depression                                               [  ]1 pt           [  ]1 pt           SCORING TOTAL   (add numbers and type here)              (***)                                     A score of 3 or lower indicated LOW risk for future opioid abuse  A score of 4 to 7 indicated moderate risk for future opioid abuse  A score of 8 or higher indicates a high risk for opioid abuse 89 y/o F with PMH HTN, valvular disease(aortic and mitral) who is scheduled for TAVR,  is here accompanied by her daughter , she said she is been experiencing worsening KNUTSON.    WOLFI VTE 2.0 SCORE [CLOT updated 2019]    AGE RELATED RISK FACTORS                                                       MOBILITY RELATED FACTORS  [ ] Age 41-60 years                                            (1 Point)                    [ ] Bed rest                                                        (1 Point)  [ ] Age: 61-74 years                                           (2 Points)                  [ ] Plaster cast                                                   (2 Points)  [x ] Age= 75 years                                              (3 Points)                    [ ] Bed bound for more than 72 hours                 (2 Points)    DISEASE RELATED RISK FACTORS                                               GENDER SPECIFIC FACTORS  [ ] Edema in the lower extremities                       (1 Point)              [ ] Pregnancy                                                     (1 Point)  [ ] Varicose veins                                               (1 Point)                     [ ] Post-partum < 6 weeks                                   (1 Point)             [ ] BMI > 25 Kg/m2                                            (1 Point)                     [ ] Hormonal therapy  or oral contraception          (1 Point)                 [ ] Sepsis (in the previous month)                        (1 Point)               [ ] History of pregnancy complications                 (1 point)  [ ] Pneumonia or serious lung disease                                               [ ] Unexplained or recurrent                     (1 Point)           (in the previous month)                               (1 Point)  [ ] Abnormal pulmonary function test                     (1 Point)                 SURGERY RELATED RISK FACTORS  [ ] Acute myocardial infarction                              (1 Point)               [ ]  Section                                             (1 Point)  [ ] Congestive heart failure (in the previous month)  (1 Point)      [ ] Minor surgery                                                  (1 Point)   [ ] Inflammatory bowel disease                             (1 Point)               [ ] Arthroscopic surgery                                        (2 Points)  [ ] Central venous access                                      (2 Points)                [x ] General surgery lasting more than 45 minutes (2 points)  [x ] Malignancy- Present or previous                   (2 Points)                [ ] Elective arthroplasty                                         (5 points)    [ ] Stroke (in the previous month)                          (5 Points)                                                                                                                                                           HEMATOLOGY RELATED FACTORS                                                 TRAUMA RELATED RISK FACTORS  [ ] Prior episodes of VTE                                     (3 Points)                [ ] Fracture of the hip, pelvis, or leg                       (5 Points)  [ ] Positive family history for VTE                         (3 Points)             [ ] Acute spinal cord injury (in the previous month)  (5 Points)  [ ] Prothrombin 21848 A                                     (3 Points)               [ ] Paralysis  (less than 1 month)                             (5 Points)  [ ] Factor V Leiden                                             (3 Points)                  [ ] Multiple Trauma within 1 month                        (5 Points)  [ ] Lupus anticoagulants                                     (3 Points)                                                           [ ] Anticardiolipin antibodies                               (3 Points)                                                       [ ] High homocysteine in the blood                      (3 Points)                                             [ ] Other congenital or acquired thrombophilia      (3 Points)                                                [ ] Heparin induced thrombocytopenia                  (3 Points)                                     (7       ]    OPIOID RISK TOOL    EDVIN EACH BOX THAT APPLIES AND ADD TOTALS AT THE END    FAMILY HISTORY OF SUBSTANCE ABUSE                 FEMALE         MALE                                                Alcohol                             [  ]1 pt          [  ]3pts                                               Illegal Drugs                     [  ]2 pts        [  ]3pts                                               Rx Drugs                           [  ]4 pts        [  ]4 pts    PERSONAL HISTORY OF SUBSTANCE ABUSE                                                                                          Alcohol                             [  ]3 pts       [  ]3 pts                                               Illegal Drugs                     [  ]4 pts        [  ]4 pts                                               Rx Drugs                           [  ]5 pts        [  ]5 pts    AGE BETWEEN 16-45 YEARS                                      [  ]1 pt         [  ]1 pt    HISTORY OF PREADOLESCENT   SEXUAL ABUSE                                                             [  ]3 pts        [  ]0pts    PSYCHOLOGICAL DISEASE                     ADD, OCD, Bipolar, Schizophrenia        [  ]2 pts         [  ]2 pts                      Depression                                               [  ]1 pt           [  ]1 pt           SCORING TOTAL   (add numbers and type here)              ( 0)                                     A score of 3 or lower indicated LOW risk for future opioid abuse  A score of 4 to 7 indicated moderate risk for future opioid abuse  A score of 8 or higher indicates a high risk for opioid abuse 89 y/o F with PMH HTN, valvular disease(aortic and mitral) who is scheduled for TAVR,  is here accompanied by her daughter , she said she is been experiencing worsening KNUTSON.    medications reviewed, instructions given on what medications to take and what not to take. Instruction are given to patient and her daughter who is with her, Plavix can be continued as per CTS, Asked the patient to take the Blood pressure medication/ heart medication on DOP (metoprolol). Rosuvastatin can be continued till day before . pt is not taking ASA or any other Anticoagulation medication at this time. Asked the pt not to take any NSAID's 5-7 days before surgery and told the pt Tylenol is okay to take for pain, pt verbalized understanding.   CAPRINI VTE 2.0 SCORE [CLOT updated 2019]    AGE RELATED RISK FACTORS                                                       MOBILITY RELATED FACTORS  [ ] Age 41-60 years                                            (1 Point)                    [ ] Bed rest                                                        (1 Point)  [ ] Age: 61-74 years                                           (2 Points)                  [ ] Plaster cast                                                   (2 Points)  [x ] Age= 75 years                                              (3 Points)                    [ ] Bed bound for more than 72 hours                 (2 Points)    DISEASE RELATED RISK FACTORS                                               GENDER SPECIFIC FACTORS  [ ] Edema in the lower extremities                       (1 Point)              [ ] Pregnancy                                                     (1 Point)  [ ] Varicose veins                                               (1 Point)                     [ ] Post-partum < 6 weeks                                   (1 Point)             [ ] BMI > 25 Kg/m2                                            (1 Point)                     [ ] Hormonal therapy  or oral contraception          (1 Point)                 [ ] Sepsis (in the previous month)                        (1 Point)               [ ] History of pregnancy complications                 (1 point)  [ ] Pneumonia or serious lung disease                                               [ ] Unexplained or recurrent                     (1 Point)           (in the previous month)                               (1 Point)  [ ] Abnormal pulmonary function test                     (1 Point)                 SURGERY RELATED RISK FACTORS  [ ] Acute myocardial infarction                              (1 Point)               [ ]  Section                                             (1 Point)  [ ] Congestive heart failure (in the previous month)  (1 Point)      [ ] Minor surgery                                                  (1 Point)   [ ] Inflammatory bowel disease                             (1 Point)               [ ] Arthroscopic surgery                                        (2 Points)  [ ] Central venous access                                      (2 Points)                [x ] General surgery lasting more than 45 minutes (2 points)  [x ] Malignancy- Present or previous                   (2 Points)                [ ] Elective arthroplasty                                         (5 points)    [ ] Stroke (in the previous month)                          (5 Points)                                                                                                                                                           HEMATOLOGY RELATED FACTORS                                                 TRAUMA RELATED RISK FACTORS  [ ] Prior episodes of VTE                                     (3 Points)                [ ] Fracture of the hip, pelvis, or leg                       (5 Points)  [ ] Positive family history for VTE                         (3 Points)             [ ] Acute spinal cord injury (in the previous month)  (5 Points)  [ ] Prothrombin 81398 A                                     (3 Points)               [ ] Paralysis  (less than 1 month)                             (5 Points)  [ ] Factor V Leiden                                             (3 Points)                  [ ] Multiple Trauma within 1 month                        (5 Points)  [ ] Lupus anticoagulants                                     (3 Points)                                                           [ ] Anticardiolipin antibodies                               (3 Points)                                                       [ ] High homocysteine in the blood                      (3 Points)                                             [ ] Other congenital or acquired thrombophilia      (3 Points)                                                [ ] Heparin induced thrombocytopenia                  (3 Points)                                     (7       ]    OPIOID RISK TOOL    EDVIN EACH BOX THAT APPLIES AND ADD TOTALS AT THE END    FAMILY HISTORY OF SUBSTANCE ABUSE                 FEMALE         MALE                                                Alcohol                             [  ]1 pt          [  ]3pts                                               Illegal Drugs                     [  ]2 pts        [  ]3pts                                               Rx Drugs                           [  ]4 pts        [  ]4 pts    PERSONAL HISTORY OF SUBSTANCE ABUSE                                                                                          Alcohol                             [  ]3 pts       [  ]3 pts                                               Illegal Drugs                     [  ]4 pts        [  ]4 pts                                               Rx Drugs                           [  ]5 pts        [  ]5 pts    AGE BETWEEN 16-45 YEARS                                      [  ]1 pt         [  ]1 pt    HISTORY OF PREADOLESCENT   SEXUAL ABUSE                                                             [  ]3 pts        [  ]0pts    PSYCHOLOGICAL DISEASE                     ADD, OCD, Bipolar, Schizophrenia        [  ]2 pts         [  ]2 pts                      Depression                                               [  ]1 pt           [  ]1 pt           SCORING TOTAL   (add numbers and type here)              ( 0)                                     A score of 3 or lower indicated LOW risk for future opioid abuse  A score of 4 to 7 indicated moderate risk for future opioid abuse  A score of 8 or higher indicates a high risk for opioid abuse

## 2021-07-10 LAB
CULTURE RESULTS: SIGNIFICANT CHANGE UP
SPECIMEN SOURCE: SIGNIFICANT CHANGE UP

## 2021-07-13 ENCOUNTER — TRANSCRIPTION ENCOUNTER (OUTPATIENT)
Age: 86
End: 2021-07-13

## 2021-07-14 ENCOUNTER — APPOINTMENT (OUTPATIENT)
Dept: CARDIOTHORACIC SURGERY | Facility: HOSPITAL | Age: 86
End: 2021-07-14

## 2021-07-14 ENCOUNTER — INPATIENT (INPATIENT)
Facility: HOSPITAL | Age: 86
LOS: 5 days | Discharge: HOME | DRG: 266 | End: 2021-07-20
Attending: THORACIC SURGERY (CARDIOTHORACIC VASCULAR SURGERY) | Admitting: THORACIC SURGERY (CARDIOTHORACIC VASCULAR SURGERY)
Payer: MEDICARE

## 2021-07-14 ENCOUNTER — APPOINTMENT (OUTPATIENT)
Dept: CARDIOTHORACIC SURGERY | Facility: CLINIC | Age: 86
End: 2021-07-14
Payer: MEDICARE

## 2021-07-14 VITALS
DIASTOLIC BLOOD PRESSURE: 66 MMHG | RESPIRATION RATE: 16 BRPM | TEMPERATURE: 99 F | HEIGHT: 61 IN | WEIGHT: 106.7 LBS | HEART RATE: 66 BPM | SYSTOLIC BLOOD PRESSURE: 168 MMHG | OXYGEN SATURATION: 97 %

## 2021-07-14 DIAGNOSIS — I35.0 NONRHEUMATIC AORTIC (VALVE) STENOSIS: ICD-10-CM

## 2021-07-14 DIAGNOSIS — Z01.818 ENCOUNTER FOR OTHER PREPROCEDURAL EXAMINATION: ICD-10-CM

## 2021-07-14 DIAGNOSIS — Z98.49 CATARACT EXTRACTION STATUS, UNSPECIFIED EYE: Chronic | ICD-10-CM

## 2021-07-14 DIAGNOSIS — Z90.12 ACQUIRED ABSENCE OF LEFT BREAST AND NIPPLE: Chronic | ICD-10-CM

## 2021-07-14 LAB
ABO RH CONFIRMATION: SIGNIFICANT CHANGE UP
ALBUMIN SERPL ELPH-MCNC: 3.9 G/DL — SIGNIFICANT CHANGE UP (ref 3.3–5.2)
ALP SERPL-CCNC: 78 U/L — SIGNIFICANT CHANGE UP (ref 40–120)
ALT FLD-CCNC: 13 U/L — SIGNIFICANT CHANGE UP
ANION GAP SERPL CALC-SCNC: 12 MMOL/L — SIGNIFICANT CHANGE UP (ref 5–17)
APTT BLD: 33.5 SEC — SIGNIFICANT CHANGE UP (ref 27.5–35.5)
AST SERPL-CCNC: 22 U/L — SIGNIFICANT CHANGE UP
BASOPHILS # BLD AUTO: 0 K/UL — SIGNIFICANT CHANGE UP (ref 0–0.2)
BASOPHILS NFR BLD AUTO: 0 % — SIGNIFICANT CHANGE UP (ref 0–2)
BILIRUB SERPL-MCNC: 0.6 MG/DL — SIGNIFICANT CHANGE UP (ref 0.4–2)
BUN SERPL-MCNC: 16.1 MG/DL — SIGNIFICANT CHANGE UP (ref 8–20)
CALCIUM SERPL-MCNC: 10.1 MG/DL — SIGNIFICANT CHANGE UP (ref 8.6–10.2)
CHLORIDE SERPL-SCNC: 103 MMOL/L — SIGNIFICANT CHANGE UP (ref 98–107)
CO2 SERPL-SCNC: 21 MMOL/L — LOW (ref 22–29)
CREAT SERPL-MCNC: 0.59 MG/DL — SIGNIFICANT CHANGE UP (ref 0.5–1.3)
ELLIPTOCYTES BLD QL SMEAR: SLIGHT — SIGNIFICANT CHANGE UP
EOSINOPHIL # BLD AUTO: 0 K/UL — SIGNIFICANT CHANGE UP (ref 0–0.5)
EOSINOPHIL NFR BLD AUTO: 0 % — SIGNIFICANT CHANGE UP (ref 0–6)
GAS PNL BLDA: SIGNIFICANT CHANGE UP
GIANT PLATELETS BLD QL SMEAR: PRESENT — SIGNIFICANT CHANGE UP
GLUCOSE BLDC GLUCOMTR-MCNC: 102 MG/DL — HIGH (ref 70–99)
GLUCOSE SERPL-MCNC: 130 MG/DL — HIGH (ref 70–99)
HCT VFR BLD CALC: 29.9 % — LOW (ref 34.5–45)
HGB BLD-MCNC: 9.8 G/DL — LOW (ref 11.5–15.5)
INR BLD: 1.23 RATIO — HIGH (ref 0.88–1.16)
LYMPHOCYTES # BLD AUTO: 0.89 K/UL — LOW (ref 1–3.3)
LYMPHOCYTES # BLD AUTO: 8.7 % — LOW (ref 13–44)
MAGNESIUM SERPL-MCNC: 1.7 MG/DL — SIGNIFICANT CHANGE UP (ref 1.6–2.6)
MANUAL SMEAR VERIFICATION: SIGNIFICANT CHANGE UP
MCHC RBC-ENTMCNC: 28.2 PG — SIGNIFICANT CHANGE UP (ref 27–34)
MCHC RBC-ENTMCNC: 32.8 GM/DL — SIGNIFICANT CHANGE UP (ref 32–36)
MCV RBC AUTO: 86.2 FL — SIGNIFICANT CHANGE UP (ref 80–100)
MONOCYTES # BLD AUTO: 0.89 K/UL — SIGNIFICANT CHANGE UP (ref 0–0.9)
MONOCYTES NFR BLD AUTO: 8.7 % — SIGNIFICANT CHANGE UP (ref 2–14)
NEUTROPHILS # BLD AUTO: 8.47 K/UL — HIGH (ref 1.8–7.4)
NEUTROPHILS NFR BLD AUTO: 81.7 % — HIGH (ref 43–77)
NEUTS BAND # BLD: 0.9 % — SIGNIFICANT CHANGE UP (ref 0–8)
OVALOCYTES BLD QL SMEAR: SLIGHT — SIGNIFICANT CHANGE UP
PHOSPHATE SERPL-MCNC: 3.4 MG/DL — SIGNIFICANT CHANGE UP (ref 2.4–4.7)
PLAT MORPH BLD: NORMAL — SIGNIFICANT CHANGE UP
PLATELET # BLD AUTO: 167 K/UL — SIGNIFICANT CHANGE UP (ref 150–400)
POIKILOCYTOSIS BLD QL AUTO: SLIGHT — SIGNIFICANT CHANGE UP
POLYCHROMASIA BLD QL SMEAR: SLIGHT — SIGNIFICANT CHANGE UP
POTASSIUM SERPL-MCNC: 4.8 MMOL/L — SIGNIFICANT CHANGE UP (ref 3.5–5.3)
POTASSIUM SERPL-SCNC: 4.8 MMOL/L — SIGNIFICANT CHANGE UP (ref 3.5–5.3)
PROT SERPL-MCNC: 6.6 G/DL — SIGNIFICANT CHANGE UP (ref 6.6–8.7)
PROTHROM AB SERPL-ACNC: 14.1 SEC — HIGH (ref 10.6–13.6)
RBC # BLD: 3.47 M/UL — LOW (ref 3.8–5.2)
RBC # FLD: 13.8 % — SIGNIFICANT CHANGE UP (ref 10.3–14.5)
RBC BLD AUTO: ABNORMAL
SCHISTOCYTES BLD QL AUTO: SLIGHT — SIGNIFICANT CHANGE UP
SODIUM SERPL-SCNC: 136 MMOL/L — SIGNIFICANT CHANGE UP (ref 135–145)
WBC # BLD: 10.25 K/UL — SIGNIFICANT CHANGE UP (ref 3.8–10.5)
WBC # FLD AUTO: 10.25 K/UL — SIGNIFICANT CHANGE UP (ref 3.8–10.5)

## 2021-07-14 PROCEDURE — G0463: CPT

## 2021-07-14 PROCEDURE — 93312 ECHO TRANSESOPHAGEAL: CPT

## 2021-07-14 PROCEDURE — 93005 ELECTROCARDIOGRAM TRACING: CPT

## 2021-07-14 PROCEDURE — 93010 ELECTROCARDIOGRAM REPORT: CPT

## 2021-07-14 PROCEDURE — 93355 ECHO TRANSESOPHAGEAL (TEE): CPT

## 2021-07-14 PROCEDURE — MCOT1: CPT | Mod: NC

## 2021-07-14 PROCEDURE — 71045 X-RAY EXAM CHEST 1 VIEW: CPT | Mod: 26

## 2021-07-14 PROCEDURE — 93320 DOPPLER ECHO COMPLETE: CPT

## 2021-07-14 PROCEDURE — 71046 X-RAY EXAM CHEST 2 VIEWS: CPT

## 2021-07-14 PROCEDURE — 93325 DOPPLER ECHO COLOR FLOW MAPG: CPT

## 2021-07-14 PROCEDURE — 33361 REPLACE AORTIC VALVE PERQ: CPT | Mod: 62,Q0

## 2021-07-14 RX ORDER — PANTOPRAZOLE SODIUM 20 MG/1
40 TABLET, DELAYED RELEASE ORAL DAILY
Refills: 0 | Status: DISCONTINUED | OUTPATIENT
Start: 2021-07-14 | End: 2021-07-18

## 2021-07-14 RX ORDER — FAMOTIDINE 10 MG/ML
20 INJECTION INTRAVENOUS ONCE
Refills: 0 | Status: COMPLETED | OUTPATIENT
Start: 2021-07-14 | End: 2021-07-14

## 2021-07-14 RX ORDER — MAGNESIUM SULFATE 500 MG/ML
2 VIAL (ML) INJECTION ONCE
Refills: 0 | Status: COMPLETED | OUTPATIENT
Start: 2021-07-14 | End: 2021-07-14

## 2021-07-14 RX ORDER — SODIUM CHLORIDE 9 MG/ML
1000 INJECTION INTRAMUSCULAR; INTRAVENOUS; SUBCUTANEOUS
Refills: 0 | Status: DISCONTINUED | OUTPATIENT
Start: 2021-07-14 | End: 2021-07-15

## 2021-07-14 RX ORDER — CLOPIDOGREL BISULFATE 75 MG/1
75 TABLET, FILM COATED ORAL DAILY
Refills: 0 | Status: DISCONTINUED | OUTPATIENT
Start: 2021-07-15 | End: 2021-07-20

## 2021-07-14 RX ORDER — SIMVASTATIN 20 MG/1
20 TABLET, FILM COATED ORAL AT BEDTIME
Refills: 0 | Status: DISCONTINUED | OUTPATIENT
Start: 2021-07-14 | End: 2021-07-20

## 2021-07-14 RX ORDER — OXYCODONE AND ACETAMINOPHEN 5; 325 MG/1; MG/1
2 TABLET ORAL EVERY 6 HOURS
Refills: 0 | Status: DISCONTINUED | OUTPATIENT
Start: 2021-07-14 | End: 2021-07-17

## 2021-07-14 RX ORDER — OXYCODONE AND ACETAMINOPHEN 5; 325 MG/1; MG/1
1 TABLET ORAL EVERY 6 HOURS
Refills: 0 | Status: DISCONTINUED | OUTPATIENT
Start: 2021-07-14 | End: 2021-07-17

## 2021-07-14 RX ORDER — CHLORHEXIDINE GLUCONATE 213 G/1000ML
5 SOLUTION TOPICAL
Refills: 0 | Status: DISCONTINUED | OUTPATIENT
Start: 2021-07-14 | End: 2021-07-16

## 2021-07-14 RX ORDER — ALBUMIN HUMAN 25 %
250 VIAL (ML) INTRAVENOUS ONCE
Refills: 0 | Status: COMPLETED | OUTPATIENT
Start: 2021-07-14 | End: 2021-07-14

## 2021-07-14 RX ORDER — POLYETHYLENE GLYCOL 3350 17 G/17G
17 POWDER, FOR SOLUTION ORAL DAILY
Refills: 0 | Status: DISCONTINUED | OUTPATIENT
Start: 2021-07-14 | End: 2021-07-20

## 2021-07-14 RX ORDER — CEFUROXIME AXETIL 250 MG
1500 TABLET ORAL EVERY 8 HOURS
Refills: 0 | Status: COMPLETED | OUTPATIENT
Start: 2021-07-14 | End: 2021-07-15

## 2021-07-14 RX ADMIN — FAMOTIDINE 20 MILLIGRAM(S): 10 INJECTION INTRAVENOUS at 23:13

## 2021-07-14 RX ADMIN — SODIUM CHLORIDE 3 MILLILITER(S): 9 INJECTION INTRAMUSCULAR; INTRAVENOUS; SUBCUTANEOUS at 20:57

## 2021-07-14 RX ADMIN — Medication 125 MILLILITER(S): at 18:47

## 2021-07-14 RX ADMIN — Medication 100 MILLIGRAM(S): at 23:00

## 2021-07-14 RX ADMIN — SODIUM CHLORIDE 3 MILLILITER(S): 9 INJECTION INTRAMUSCULAR; INTRAVENOUS; SUBCUTANEOUS at 16:31

## 2021-07-14 RX ADMIN — Medication 50 GRAM(S): at 18:48

## 2021-07-14 RX ADMIN — PANTOPRAZOLE SODIUM 40 MILLIGRAM(S): 20 TABLET, DELAYED RELEASE ORAL at 23:12

## 2021-07-14 RX ADMIN — SIMVASTATIN 20 MILLIGRAM(S): 20 TABLET, FILM COATED ORAL at 23:12

## 2021-07-14 NOTE — BRIEF OPERATIVE NOTE - COMMENTS
[employer] Representative: Massimo Bray & Lin Cohen (clinical support)  Invasive Lines: Left Radial Arterial Line, Left Femoral Arterial & Venous Sheaths ?  IV Medication Infusions: ? [employer] Representative: Massimo Bray & Lin Cohen (clinical support)  Invasive Lines: Left Radial Arterial Line, Left Femoral Venous Sheath  IV Medication Infusions: None

## 2021-07-14 NOTE — BRIEF OPERATIVE NOTE - NSICDXBRIEFPREOP_GEN_ALL_CORE_FT
PRE-OP DIAGNOSIS:  Severe aortic stenosis 14-Jul-2021 14:59:40  Jayce Bowie  Chronic diastolic CHF (congestive heart failure), NYHA class 3 14-Jul-2021 14:59:51  Jayce Bowie

## 2021-07-14 NOTE — ASU PREOP CHECKLIST - BOWEL PREP
n/a Labs and imaging noted, pt is well appearing, will dc with meds, copies of results to follow up with private GI. precautions reviewed.

## 2021-07-14 NOTE — BRIEF OPERATIVE NOTE - NSICDXBRIEFPOSTOP_GEN_ALL_CORE_FT
POST-OP DIAGNOSIS:  Severe aortic stenosis 14-Jul-2021 15:00:14  Jayce Bowie  Chronic diastolic CHF (congestive heart failure), NYHA class 3 14-Jul-2021 15:00:25  Jayce Bowie   POST-OP DIAGNOSIS:  Severe aortic stenosis 14-Jul-2021 15:00:14  Jayce Bowie  Chronic diastolic CHF (congestive heart failure), NYHA class 3 14-Jul-2021 15:00:25  Jayce Bowie  Left bundle branch block 14-Jul-2021 15:38:30  Jayce Bowie

## 2021-07-14 NOTE — BRIEF OPERATIVE NOTE - OPERATION/FINDINGS
Severe AS, Post Deployment ? PVL (mean gradient ? mmHg & JENNIFER ?), ? Conduction or Rhythm Disturbances, Extubated in the OR? Severe AS, Post Deployment Mild PVL (mean gradient 2 mmHg & JENNIFER 2.68), with New Left Bundle Branch Block, Extubated in the OR

## 2021-07-14 NOTE — BRIEF OPERATIVE NOTE - NSICDXBRIEFPROCEDURE_GEN_ALL_CORE_FT
PROCEDURES:  TAVR, percutaneous 14-Jul-2021 15:00:34 Percutaneous Transfemoral TAVR via Right Common Femoral Artery (20mm Emil 3 Ultra) (NCT# [id number]) (STS/[facility] TVT Registry Patient ID# [id number]) Jayce Bowie

## 2021-07-15 DIAGNOSIS — I10 ESSENTIAL (PRIMARY) HYPERTENSION: ICD-10-CM

## 2021-07-15 DIAGNOSIS — I35.0 NONRHEUMATIC AORTIC (VALVE) STENOSIS: ICD-10-CM

## 2021-07-15 LAB
ANION GAP SERPL CALC-SCNC: 11 MMOL/L — SIGNIFICANT CHANGE UP (ref 5–17)
BUN SERPL-MCNC: 16.6 MG/DL — SIGNIFICANT CHANGE UP (ref 8–20)
CALCIUM SERPL-MCNC: 9.1 MG/DL — SIGNIFICANT CHANGE UP (ref 8.6–10.2)
CHLORIDE SERPL-SCNC: 100 MMOL/L — SIGNIFICANT CHANGE UP (ref 98–107)
CO2 SERPL-SCNC: 24 MMOL/L — SIGNIFICANT CHANGE UP (ref 22–29)
COVID-19 SPIKE DOMAIN AB INTERP: POSITIVE
COVID-19 SPIKE DOMAIN ANTIBODY RESULT: >250 U/ML — HIGH
CREAT SERPL-MCNC: 0.63 MG/DL — SIGNIFICANT CHANGE UP (ref 0.5–1.3)
GLUCOSE SERPL-MCNC: 124 MG/DL — HIGH (ref 70–99)
HCT VFR BLD CALC: 29.5 % — LOW (ref 34.5–45)
HGB BLD-MCNC: 9.7 G/DL — LOW (ref 11.5–15.5)
MAGNESIUM SERPL-MCNC: 2.5 MG/DL — SIGNIFICANT CHANGE UP (ref 1.6–2.6)
MCHC RBC-ENTMCNC: 28.5 PG — SIGNIFICANT CHANGE UP (ref 27–34)
MCHC RBC-ENTMCNC: 32.9 GM/DL — SIGNIFICANT CHANGE UP (ref 32–36)
MCV RBC AUTO: 86.8 FL — SIGNIFICANT CHANGE UP (ref 80–100)
PHOSPHATE SERPL-MCNC: 4.6 MG/DL — SIGNIFICANT CHANGE UP (ref 2.4–4.7)
PLATELET # BLD AUTO: 155 K/UL — SIGNIFICANT CHANGE UP (ref 150–400)
POTASSIUM SERPL-MCNC: 4.9 MMOL/L — SIGNIFICANT CHANGE UP (ref 3.5–5.3)
POTASSIUM SERPL-SCNC: 4.9 MMOL/L — SIGNIFICANT CHANGE UP (ref 3.5–5.3)
RBC # BLD: 3.4 M/UL — LOW (ref 3.8–5.2)
RBC # FLD: 14 % — SIGNIFICANT CHANGE UP (ref 10.3–14.5)
SARS-COV-2 IGG+IGM SERPL QL IA: >250 U/ML — HIGH
SARS-COV-2 IGG+IGM SERPL QL IA: POSITIVE
SODIUM SERPL-SCNC: 135 MMOL/L — SIGNIFICANT CHANGE UP (ref 135–145)
WBC # BLD: 5.42 K/UL — SIGNIFICANT CHANGE UP (ref 3.8–10.5)
WBC # FLD AUTO: 5.42 K/UL — SIGNIFICANT CHANGE UP (ref 3.8–10.5)

## 2021-07-15 PROCEDURE — 71045 X-RAY EXAM CHEST 1 VIEW: CPT | Mod: 26

## 2021-07-15 PROCEDURE — 93010 ELECTROCARDIOGRAM REPORT: CPT

## 2021-07-15 PROCEDURE — 99232 SBSQ HOSP IP/OBS MODERATE 35: CPT

## 2021-07-15 RX ORDER — METOPROLOL TARTRATE 50 MG
12.5 TABLET ORAL
Refills: 0 | Status: DISCONTINUED | OUTPATIENT
Start: 2021-07-15 | End: 2021-07-15

## 2021-07-15 RX ORDER — METOPROLOL TARTRATE 50 MG
25 TABLET ORAL
Refills: 0 | Status: DISCONTINUED | OUTPATIENT
Start: 2021-07-15 | End: 2021-07-15

## 2021-07-15 RX ORDER — BENZOCAINE AND MENTHOL 5; 1 G/100ML; G/100ML
1 LIQUID ORAL
Refills: 0 | Status: DISCONTINUED | OUTPATIENT
Start: 2021-07-15 | End: 2021-07-20

## 2021-07-15 RX ORDER — METOPROLOL TARTRATE 50 MG
25 TABLET ORAL
Refills: 0 | Status: DISCONTINUED | OUTPATIENT
Start: 2021-07-16 | End: 2021-07-17

## 2021-07-15 RX ADMIN — PANTOPRAZOLE SODIUM 40 MILLIGRAM(S): 20 TABLET, DELAYED RELEASE ORAL at 12:21

## 2021-07-15 RX ADMIN — SODIUM CHLORIDE 3 MILLILITER(S): 9 INJECTION INTRAMUSCULAR; INTRAVENOUS; SUBCUTANEOUS at 05:00

## 2021-07-15 RX ADMIN — BENZOCAINE AND MENTHOL 1 LOZENGE: 5; 1 LIQUID ORAL at 19:47

## 2021-07-15 RX ADMIN — SODIUM CHLORIDE 3 MILLILITER(S): 9 INJECTION INTRAMUSCULAR; INTRAVENOUS; SUBCUTANEOUS at 14:18

## 2021-07-15 RX ADMIN — Medication 12.5 MILLIGRAM(S): at 19:46

## 2021-07-15 RX ADMIN — CLOPIDOGREL BISULFATE 75 MILLIGRAM(S): 75 TABLET, FILM COATED ORAL at 12:21

## 2021-07-15 RX ADMIN — SODIUM CHLORIDE 3 MILLILITER(S): 9 INJECTION INTRAMUSCULAR; INTRAVENOUS; SUBCUTANEOUS at 19:51

## 2021-07-15 RX ADMIN — SIMVASTATIN 20 MILLIGRAM(S): 20 TABLET, FILM COATED ORAL at 19:46

## 2021-07-15 RX ADMIN — Medication 100 MILLIGRAM(S): at 06:59

## 2021-07-15 NOTE — PROGRESS NOTE ADULT - ASSESSMENT
89 y/o F with PMH HTN, mod MR/TR, diastolic CHF, CAD, TIA, PAD, breast CAvalvular disease(aortic and mitral) who has been experiencing worsening SOB. Now s/p TF-TAVR via RCFA. Postop course notable for new LBBB.

## 2021-07-16 ENCOUNTER — TRANSCRIPTION ENCOUNTER (OUTPATIENT)
Age: 86
End: 2021-07-16

## 2021-07-16 PROBLEM — C50.919 MALIGNANT NEOPLASM OF UNSPECIFIED SITE OF UNSPECIFIED FEMALE BREAST: Chronic | Status: ACTIVE | Noted: 2021-05-13

## 2021-07-16 LAB
ANION GAP SERPL CALC-SCNC: 7 MMOL/L — SIGNIFICANT CHANGE UP (ref 5–17)
BUN SERPL-MCNC: 23 MG/DL — HIGH (ref 8–20)
CALCIUM SERPL-MCNC: 9.3 MG/DL — SIGNIFICANT CHANGE UP (ref 8.6–10.2)
CHLORIDE SERPL-SCNC: 99 MMOL/L — SIGNIFICANT CHANGE UP (ref 98–107)
CO2 SERPL-SCNC: 28 MMOL/L — SIGNIFICANT CHANGE UP (ref 22–29)
CREAT SERPL-MCNC: 0.71 MG/DL — SIGNIFICANT CHANGE UP (ref 0.5–1.3)
GLUCOSE SERPL-MCNC: 110 MG/DL — HIGH (ref 70–99)
HCT VFR BLD CALC: 30.7 % — LOW (ref 34.5–45)
HGB BLD-MCNC: 10.2 G/DL — LOW (ref 11.5–15.5)
MAGNESIUM SERPL-MCNC: 2.1 MG/DL — SIGNIFICANT CHANGE UP (ref 1.8–2.6)
MCHC RBC-ENTMCNC: 29.1 PG — SIGNIFICANT CHANGE UP (ref 27–34)
MCHC RBC-ENTMCNC: 33.2 GM/DL — SIGNIFICANT CHANGE UP (ref 32–36)
MCV RBC AUTO: 87.5 FL — SIGNIFICANT CHANGE UP (ref 80–100)
PLATELET # BLD AUTO: 132 K/UL — LOW (ref 150–400)
POTASSIUM SERPL-MCNC: 4.5 MMOL/L — SIGNIFICANT CHANGE UP (ref 3.5–5.3)
POTASSIUM SERPL-SCNC: 4.5 MMOL/L — SIGNIFICANT CHANGE UP (ref 3.5–5.3)
RBC # BLD: 3.51 M/UL — LOW (ref 3.8–5.2)
RBC # FLD: 14.3 % — SIGNIFICANT CHANGE UP (ref 10.3–14.5)
SODIUM SERPL-SCNC: 134 MMOL/L — LOW (ref 135–145)
WBC # BLD: 10.97 K/UL — HIGH (ref 3.8–10.5)
WBC # FLD AUTO: 10.97 K/UL — HIGH (ref 3.8–10.5)

## 2021-07-16 PROCEDURE — 71045 X-RAY EXAM CHEST 1 VIEW: CPT | Mod: 26

## 2021-07-16 PROCEDURE — 93010 ELECTROCARDIOGRAM REPORT: CPT | Mod: 76

## 2021-07-16 PROCEDURE — 99231 SBSQ HOSP IP/OBS SF/LOW 25: CPT

## 2021-07-16 RX ORDER — SODIUM CHLORIDE 9 MG/ML
500 INJECTION INTRAMUSCULAR; INTRAVENOUS; SUBCUTANEOUS ONCE
Refills: 0 | Status: COMPLETED | OUTPATIENT
Start: 2021-07-16 | End: 2021-07-16

## 2021-07-16 RX ORDER — SIMETHICONE 80 MG/1
80 TABLET, CHEWABLE ORAL ONCE
Refills: 0 | Status: COMPLETED | OUTPATIENT
Start: 2021-07-16 | End: 2021-07-16

## 2021-07-16 RX ORDER — MAGNESIUM SULFATE 500 MG/ML
1 VIAL (ML) INJECTION ONCE
Refills: 0 | Status: COMPLETED | OUTPATIENT
Start: 2021-07-16 | End: 2021-07-16

## 2021-07-16 RX ORDER — SORBITOL SOLUTION 70 %
30 SOLUTION, ORAL MISCELLANEOUS ONCE
Refills: 0 | Status: COMPLETED | OUTPATIENT
Start: 2021-07-16 | End: 2021-07-16

## 2021-07-16 RX ORDER — ONDANSETRON 8 MG/1
4 TABLET, FILM COATED ORAL ONCE
Refills: 0 | Status: COMPLETED | OUTPATIENT
Start: 2021-07-16 | End: 2021-07-16

## 2021-07-16 RX ORDER — MAGNESIUM SULFATE 500 MG/ML
2 VIAL (ML) INJECTION ONCE
Refills: 0 | Status: COMPLETED | OUTPATIENT
Start: 2021-07-16 | End: 2021-07-16

## 2021-07-16 RX ORDER — METOPROLOL TARTRATE 50 MG
5 TABLET ORAL ONCE
Refills: 0 | Status: COMPLETED | OUTPATIENT
Start: 2021-07-16 | End: 2021-07-16

## 2021-07-16 RX ORDER — AMIODARONE HYDROCHLORIDE 400 MG/1
TABLET ORAL
Refills: 0 | Status: DISCONTINUED | OUTPATIENT
Start: 2021-07-18 | End: 2021-07-19

## 2021-07-16 RX ORDER — AMIODARONE HYDROCHLORIDE 400 MG/1
150 TABLET ORAL ONCE
Refills: 0 | Status: COMPLETED | OUTPATIENT
Start: 2021-07-16 | End: 2021-07-16

## 2021-07-16 RX ORDER — AMIODARONE HYDROCHLORIDE 400 MG/1
400 TABLET ORAL EVERY 8 HOURS
Refills: 0 | Status: DISCONTINUED | OUTPATIENT
Start: 2021-07-16 | End: 2021-07-19

## 2021-07-16 RX ADMIN — AMIODARONE HYDROCHLORIDE 618 MILLIGRAM(S): 400 TABLET ORAL at 18:25

## 2021-07-16 RX ADMIN — SODIUM CHLORIDE 500 MILLILITER(S): 9 INJECTION INTRAMUSCULAR; INTRAVENOUS; SUBCUTANEOUS at 18:27

## 2021-07-16 RX ADMIN — PANTOPRAZOLE SODIUM 40 MILLIGRAM(S): 20 TABLET, DELAYED RELEASE ORAL at 09:27

## 2021-07-16 RX ADMIN — SODIUM CHLORIDE 3 MILLILITER(S): 9 INJECTION INTRAMUSCULAR; INTRAVENOUS; SUBCUTANEOUS at 14:45

## 2021-07-16 RX ADMIN — SIMETHICONE 80 MILLIGRAM(S): 80 TABLET, CHEWABLE ORAL at 18:44

## 2021-07-16 RX ADMIN — POLYETHYLENE GLYCOL 3350 17 GRAM(S): 17 POWDER, FOR SOLUTION ORAL at 09:27

## 2021-07-16 RX ADMIN — Medication 50 GRAM(S): at 18:27

## 2021-07-16 RX ADMIN — SIMVASTATIN 20 MILLIGRAM(S): 20 TABLET, FILM COATED ORAL at 21:46

## 2021-07-16 RX ADMIN — ONDANSETRON 4 MILLIGRAM(S): 8 TABLET, FILM COATED ORAL at 12:01

## 2021-07-16 RX ADMIN — AMIODARONE HYDROCHLORIDE 400 MILLIGRAM(S): 400 TABLET ORAL at 18:53

## 2021-07-16 RX ADMIN — CLOPIDOGREL BISULFATE 75 MILLIGRAM(S): 75 TABLET, FILM COATED ORAL at 09:27

## 2021-07-16 RX ADMIN — Medication 30 MILLILITER(S): at 11:02

## 2021-07-16 RX ADMIN — Medication 25 MILLIGRAM(S): at 05:06

## 2021-07-16 RX ADMIN — SODIUM CHLORIDE 3 MILLILITER(S): 9 INJECTION INTRAMUSCULAR; INTRAVENOUS; SUBCUTANEOUS at 21:39

## 2021-07-16 RX ADMIN — Medication 5 MILLIGRAM(S): at 17:50

## 2021-07-16 RX ADMIN — SODIUM CHLORIDE 3 MILLILITER(S): 9 INJECTION INTRAMUSCULAR; INTRAVENOUS; SUBCUTANEOUS at 05:05

## 2021-07-16 NOTE — DISCHARGE NOTE PROVIDER - INSTRUCTIONS
Choose lean meats and poultry without skin and prepare them without added saturated and trans fat.  Eat fish at least twice a week. Recent research shows that eating oily fish containing omega-3 fatty acids (for example, salmon, trout and herring) may help lower your risk of death from coronary artery disease.  Select fat-free, 1 percent fat and low-fat dairy products.  Cut back on foods containing partially hydrogenated vegetable oils to reduce trans fat in your diet.   To lower cholesterol, reduce saturated fat to no more than 5 to 6 percent of total calories. For someone eating 2,000 calories a day, that’s about 13 grams of saturated fat.  Cut back on beverages and foods with added sugars.  Choose and prepare foods with little or no salt.

## 2021-07-16 NOTE — DISCHARGE NOTE PROVIDER - PROVIDER TOKENS
PROVIDER:[TOKEN:[[id number]]] PROVIDER:[TOKEN:[[id number]]],PROVIDER:[TOKEN:[[id number]]]

## 2021-07-16 NOTE — DISCHARGE NOTE PROVIDER - NSDCMRMEDTOKEN_GEN_ALL_CORE_FT
clopidogrel 75 mg oral tablet: 1 tab(s) orally once a day  metoprolol succinate 25 mg oral capsule, extended release: 1 tab(s) orally once a day (at bedtime)  simvastatin 20 mg oral tablet: 1 tab(s) orally once a day (at bedtime)   clopidogrel 75 mg oral tablet: 1 tab(s) orally once a day  DME: rolling walker for assit with ambulation   metoprolol succinate 25 mg oral capsule, extended release: 1 tab(s) orally once a day (at bedtime)  simvastatin 20 mg oral tablet: 1 tab(s) orally once a day (at bedtime)   amiodarone 200 mg oral tablet: 1 tab(s) orally once a day  clopidogrel 75 mg oral tablet: 1 tab(s) orally once a day  DME: rolling walker for assit with ambulation   Protonix 40 mg oral delayed release tablet: 1 tab(s) orally once a day  senna oral tablet: 2 tab(s) orally once a day (at bedtime)  simvastatin 20 mg oral tablet: 1 tab(s) orally once a day (at bedtime)

## 2021-07-16 NOTE — DISCHARGE NOTE PROVIDER - NSDCFUADDINST_GEN_ALL_CORE_FT
Please call the Cardiothoracic Surgery office at [phone] if you are experiencing any shortness of breath, chest pain, fevers or chills, drainage from the incisions, persistent nausea, vomiting or if you have any questions about your medications. If the symptoms are severe, call 911 and go to the nearest hospital.

## 2021-07-16 NOTE — DISCHARGE NOTE PROVIDER - NSDCCPTREATMENT_GEN_ALL_CORE_FT
PRINCIPAL PROCEDURE  Procedure: TAVR, percutaneous  Findings and Treatment: Percutaneous Transfemoral TAVR via Right Common Femoral Artery (20mm Emil 3 Ultra) (NCT# [id number]) (STS/[facility] TVT Registry Patient ID# [id number])

## 2021-07-16 NOTE — PHYSICAL THERAPY INITIAL EVALUATION ADULT - ADDITIONAL COMMENTS
pt states she lives alone in a 2 story house with no steps to enter then 13 to second floor (+rail). pt has stairglide, but does not often use. no DME. independent amb prior to admit. states her daughter will be staying with her upon discharge and is able to assist.

## 2021-07-16 NOTE — PHYSICAL THERAPY INITIAL EVALUATION ADULT - LEVEL OF INDEPENDENCE: STAIR NEGOTIATION, REHAB EVAL
pt offered and declined stair training due to fatigue. states she will use her stairglide at this time./unable to perform

## 2021-07-16 NOTE — DISCHARGE NOTE NURSING/CASE MANAGEMENT/SOCIAL WORK - PATIENT PORTAL LINK FT
You can access the FollowMyHealth Patient Portal offered by [facility] by registering at the following website: [email or web address]. By joining [facility]’s FollowMyHealth portal, you will also be able to view your health information using other applications (apps) compatible with our system.

## 2021-07-16 NOTE — DISCHARGE NOTE NURSING/CASE MANAGEMENT/SOCIAL WORK - NSDCFUADDAPPT_GEN_ALL_CORE_FT
Please follow up with Dr. Jimenez on7/22 @ 7842 at [facility].   The cardiac surgery office is located on the 1st floor [address] [place], [place].     **Please obtain Chest X-Ray (script enclosed in folder) 1-2 days PRIOR to scheduled appointment, preferably at a [facility] Imaging facility.**    Your Care Navigator Nurse Practitioner will be in touch to see you in your home within a few days from discharge. The Follow Your Heart program can help ensure you understand your medications, discharge instructions and answer any questions you may have at that time. They are also a great source to address concerns during the day and may be reached at [phone].

## 2021-07-16 NOTE — DISCHARGE NOTE PROVIDER - NSDCFUADDAPPT_GEN_ALL_CORE_FT
Please follow up with Dr. Jimenez on7/22 @ 1505 at [facility].   The cardiac surgery office is located on the 1st floor [address] [place], [place].     **Please obtain Chest X-Ray (script enclosed in folder) 1-2 days PRIOR to scheduled appointment, preferably at a [facility] Imaging facility.**    Your Care Navigator Nurse Practitioner will be in touch to see you in your home within a few days from discharge. The Follow Your Heart program can help ensure you understand your medications, discharge instructions and answer any questions you may have at that time. They are also a great source to address concerns during the day and may be reached at [phone].   Please follow up with Dr. Miramontes (Dr Jimenez will be out) on Friday 7/30 at 1:30PM at [facility].   The cardiac surgery office is located on the 1st floor [address] [place], [place].     **Please obtain Chest X-Ray (script enclosed in folder) 1-2 days PRIOR to scheduled appointment, preferably at a [facility] facility.**    Your Care Navigator Nurse Practitioner will be in touch to see you in your home within a few days from discharge. The Follow Your Heart program can help ensure you understand your medications, discharge instructions and answer any questions you may have at that time. They are also a great source to address concerns during the day and may be reached at [phone].

## 2021-07-16 NOTE — PROGRESS NOTE ADULT - ASSESSMENT
89F, PMH HTN, mod MR/TR, diastolic CHF, CAD, TIA, PAD, breast CA, valvular disease(aortic and mitral) who has been experiencing worsening SOB. Now s/p TF-TAVR via [facility] 7/14 with Dr. Jimenez. Postop course notable for new LBBB, now resolved;

## 2021-07-16 NOTE — DISCHARGE NOTE PROVIDER - HOSPITAL COURSE
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 Now s/p TF-TAVR via [facility] 7/14 with Dr. Jimenez. Postop course notable for new LBBB, now resolved. Pt hemodynamically stable and determined stable for discharge as per Dr. Jimenez .    < from: TTE Echo Complete w/ Contrast w/ Doppler (07.14.21 @ 17:10) >    Summary:   1. Technically adequate study.   2. Hyperdynamic global left ventricular systolic function.   3. Left ventricular ejection fraction, by visual estimation, is >75%.   4. Spectral Doppler shows pseudonormal pattern of left ventricular myocardial filling (Grade II diastolic dysfunction).   5. Mild thickening and calcification of the posterior mitral valve leaflet.   6. Mild mitral valve regurgitation.   7. Moderate mitral annular calcification. Mean transmitral gradient at Hr of 80 BPM is 4.3 mmHg.   8. Mild-moderate tricuspid regurgitation.   9. Del Valle Emil 3 Transfemoral TAVR in the aortic position.  10. Mild pulmonic valve regurgitation.  11.Peak transaortic gradient equals 15.7 mmHg, mean transaortic gradient equals 8.2 mmHg, Prosthetic valve orifice area is 1.3 cm. DVI is 0.42. AT 89 msec.  12. Estimated pulmonary artery systolic pressure is 68.6 mmHg assuming a right atrial pressure of 15 mmHg, which is consistent with severe pulmonary hypertension.  13. Compared to a TTE from 6/21: LVEF is hyperdynamic. There is no a TAVR in aortic position. LAP and RAP are elevated. PASP which was previously mild in severity is now severe.    < end of copied text >    Neuro: A+O x 3, non-focal, speech clear and intact  HEENT:  NCAT, PERRL, EOMI. No conjuctival edema or icterus, no thrush.  Neck: supple, trachea midline  Pulm: Diminshed at the bases bilaterally,  no rales/rhonchi/wheezing, no accessory muscle use noted  CV: +S1S2  Abd: soft, NT, ND, + BS  Ext: TEJADA x 4, no edema, no cyanosis or clubbing, R groin C/D/I/soft/no hematoma + dressing. 2+ DP b/l, distal motor/neuro/circ intact.   Skin: warm, dry, well perfused   89F, PMH severe AS, HTN, mod MR/TR,  Chronic diastolic CHF(NYHA class III), CAD, HLD,  mild to moderate pulmonary HTN, TIA, PAD ( proximal subclavian stenosis, breast CA (s/p Left Mastectomy 1979 CTA, stage 2 kidney disease, mutiple pancreatic lesions (Likely IPMN patient aware), Scoliosis, cataract surgery, who has been experiencing worsening SOB. Now s/p TF-TAVR via [facility] 7/14 with Dr. Jimenez. Postop course notable for new RBBB, PAF (now SB/SR), constipation (resolved s/p disimpaction and + BM).   Pt hemodynamically stable and determined stable for discharge as per Dr. Jimenez .    < from: TTE Echo Complete w/ Contrast w/ Doppler (07.14.21 @ 17:10) >    Summary:   1. Technically adequate study.   2. Hyperdynamic global left ventricular systolic function.   3. Left ventricular ejection fraction, by visual estimation, is >75%.   4. Spectral Doppler shows pseudonormal pattern of left ventricular myocardial filling (Grade II diastolic dysfunction).   5. Mild thickening and calcification of the posterior mitral valve leaflet.   6. Mild mitral valve regurgitation.   7. Moderate mitral annular calcification. Mean transmitral gradient at Hr of 80 BPM is 4.3 mmHg.   8. Mild-moderate tricuspid regurgitation.   9. Del Valle Emil 3 Transfemoral TAVR in the aortic position.  10. Mild pulmonic valve regurgitation.  11.Peak transaortic gradient equals 15.7 mmHg, mean transaortic gradient equals 8.2 mmHg, Prosthetic valve orifice area is 1.3 cm. DVI is 0.42. AT 89 msec.  12. Estimated pulmonary artery systolic pressure is 68.6 mmHg assuming a right atrial pressure of 15 mmHg, which is consistent with severe pulmonary hypertension.  13. Compared to a TTE from 6/21: LVEF is hyperdynamic. There is no a TAVR in aortic position. LAP and RAP are elevated. PASP which was previously mild in severity is now severe.    < end of copied text >    Neuro: A+O x 3, non-focal, speech clear and intact  HEENT:  NCAT, PERRL, EOMI. No conjuctival edema or icterus, no thrush.  Neck: supple, trachea midline  Pulm: Diminshed at the bases bilaterally,  no rales/rhonchi/wheezing, no accessory muscle use noted  CV: +S1S2  Abd: soft, NT, ND, + BS  Ext: TEJADA x 4, no edema, no cyanosis or clubbing, R groin C/D/I/soft/no hematoma + dressing. 2+ DP b/l, distal motor/neuro/circ intact.   Skin: warm, dry, well perfused   89F, PMH severe AS, HTN, mod MR/TR,  Chronic diastolic CHF(NYHA class III), CAD, HLD,  mild to moderate pulmonary HTN, TIA, PAD ( proximal subclavian stenosis, breast CA (s/p Left Mastectomy 1979 CTA, stage 2 kidney disease, mutiple pancreatic lesions (Likely IPMN patient aware), Scoliosis, cataract surgery, who has been experiencing worsening SOB. Now s/p TF-TAVR via [facility] 7/14 with Dr. Jimenez. Postop course notable for new RBBB, PAF (now SB/SR), constipation (resolved s/p disimpaction and + BM).   Pt hemodynamically stable and determined stable for discharge as per Dr. Jimenez .    < from: TTE Echo Complete w/ Contrast w/ Doppler (07.14.21 @ 17:10) >    Summary:   1. Technically adequate study.   2. Hyperdynamic global left ventricular systolic function.   3. Left ventricular ejection fraction, by visual estimation, is >75%.   4. Spectral Doppler shows pseudonormal pattern of left ventricular myocardial filling (Grade II diastolic dysfunction).   5. Mild thickening and calcification of the posterior mitral valve leaflet.   6. Mild mitral valve regurgitation.   7. Moderate mitral annular calcification. Mean transmitral gradient at Hr of 80 BPM is 4.3 mmHg.   8. Mild-moderate tricuspid regurgitation.   9. Del Valle Emil 3 Transfemoral TAVR in the aortic position.  10. Mild pulmonic valve regurgitation.  11.Peak transaortic gradient equals 15.7 mmHg, mean transaortic gradient equals 8.2 mmHg, Prosthetic valve orifice area is 1.3 cm. DVI is 0.42. AT 89 msec.  12. Estimated pulmonary artery systolic pressure is 68.6 mmHg assuming a right atrial pressure of 15 mmHg, which is consistent with severe pulmonary hypertension.  13. Compared to a TTE from 6/21: LVEF is hyperdynamic. There is no a TAVR in aortic position. LAP and RAP are elevated. PASP which was previously mild in severity is now severe.    < end of copied text >

## 2021-07-16 NOTE — DISCHARGE NOTE PROVIDER - DETAILS OF MALNUTRITION DIAGNOSIS/DIAGNOSES
This patient has been assessed with a concern for Malnutrition and was treated during this hospitalization for the following Nutrition diagnosis/diagnoses:     -  07/18/2021: Severe protein-calorie malnutrition   -  07/18/2021: Underweight (BMI < 19)

## 2021-07-16 NOTE — DISCHARGE NOTE PROVIDER - NSDCCPCAREPLAN_GEN_ALL_CORE_FT
PRINCIPAL DISCHARGE DIAGNOSIS  Diagnosis: Severe aortic stenosis  Assessment and Plan of Treatment: - Keep the groin site clean and dry.  You may shower 3 days after surgery, using a gentle soap over the incision and pat the site dry.  - Watch the site for signs of redness or drainage, these should be reported to your doctor immediately.  - You will receive a wallet card about your new valve in the mail.  Please carry it with you to present to anyone who may ask if you have any medical implants.  - Be sure to inform your doctors including your dentist about your valve since you will need to take antibiotics to reduce the risk of infection before certain medical and dental procedures.  - You will be given an appointment to follow up with your doctor in approximately 4 weeks.  It is important to keep this appointment so that your new valve can be assessed.  - You may resume all your normal activities as you feel comfortable doing so. Please avoid heavy lifting for 2 weeks.  You are being discharged with a [employer] heart rate monitor. This device will monitor your heart rate for 28 days after your TAVR surgery. You may shower with the device in place but do not submerge in water. You will receive a box in the mail from  The box will include the materials to change the sensor in 14 days. At the end of 28 days, please place the sensor inside the monitor and place in mailing envelope. They can then be placed in your mailbox to return to the company. If there is any issue with your heart rate, you will recieved a phone call from the Cardiologist with further instructions. You can call the CT Surgery office with any questions.        SECONDARY DISCHARGE DIAGNOSES  Diagnosis: CHF NYHA class III, chronic, diastolic  Assessment and Plan of Treatment: You are being discharged with a diagnosis of heart failure. To prevent exacerbation of congestive heart failure (CHF) it is important to follow a heart-healthy, LOW SODIUM diet. You should read all food labels and keep your sodium intake less than 1500 mg per day. Examples of high sodium foods include fast food or processed food (ie frozen TV dinners), chinese food, soup and regular cold cuts. You should also restrict your fluid intake to less than 1-1.5 liters per day. Please weigh yourself every day at the same time in the morning and document your weight in a weight log. Please call your doctor right away if you gain over 2-3 pounds in one day, or you notice an increase in leg swelling, abdominal swelling or shortness of breath. Making sure you take all of your medications as prescribed and maintaining a normal blood pressure are also important for preventing a CHF exacerbation.      Diagnosis: HLD (hyperlipidemia)  Assessment and Plan of Treatment: Take all medications as prescribed. Follow up with your primary care doctor within two weeks.      Diagnosis: HTN (hypertension)  Assessment and Plan of Treatment: Take all medications as prescribed. Follow up with your primary care doctor within two weeks.       PRINCIPAL DISCHARGE DIAGNOSIS  Diagnosis: Severe aortic stenosis  Assessment and Plan of Treatment: - Keep the groin site clean and dry.  You may shower 3 days after surgery, using a gentle soap over the incision and pat the site dry.  - Watch the site for signs of redness or drainage, these should be reported to your doctor immediately.  - You will receive a wallet card about your new valve in the mail.  Please carry it with you to present to anyone who may ask if you have any medical implants.  - Be sure to inform your doctors including your dentist about your valve since you will need to take antibiotics to reduce the risk of infection before certain medical and dental procedures.  - You will be given an appointment to follow up with your doctor in approximately 4 weeks.  It is important to keep this appointment so that your new valve can be assessed.  - You may resume all your normal activities as you feel comfortable doing so. Please avoid heavy lifting for 2 weeks.  You are being discharged with a [employer] heart rate monitor. This device will monitor your heart rate for 28 days after your TAVR surgery. You may shower with the device in place but do not submerge in water. You will receive a box in the mail from  The box will include the materials to change the sensor in 14 days. At the end of 28 days, please place the sensor inside the monitor and place in mailing envelope. They can then be placed in your mailbox to return to the company. If there is any issue with your heart rate, you will recieved a phone call from the Cardiologist with further instructions. You can call the CT Surgery office with any questions.        SECONDARY DISCHARGE DIAGNOSES  Diagnosis: HTN (hypertension)  Assessment and Plan of Treatment: You have hypertension, or high blood pressure. It is very important to continue your medication as ordered. High blood pressure increases your risk for heart attack, stroke and kidney disease. It does not usually cause symptoms but it can be serious.   1. Be sure to take all of your medication as prescribed.  2. You may find it helpful to get a home blood pressure meter and check your blood pressure periodically.  3. Lifestyle changes can improve your blood pressure and lessen the amount of medication you need, such as: losing weight, choosing a diet low in fat and rich in fruits, vegetables and low-fat dairy products, reducing the amount of salt you eat, cut down on alcohol (to less than two drinks per day) and do something active most days for 30 minutes or more.       Diagnosis: CHF NYHA class III, chronic, diastolic  Assessment and Plan of Treatment: You are being discharged with a diagnosis of heart failure. To prevent exacerbation of congestive heart failure (CHF) it is important to follow a heart-healthy, LOW SODIUM diet. You should read all food labels and keep your sodium intake less than 1500 mg per day. Examples of high sodium foods include fast food or processed food (ie frozen TV dinners), chinese food, soup and regular cold cuts. You should also restrict your fluid intake to less than 1-1.5 liters per day. Please weigh yourself every day at the same time in the morning and document your weight in a weight log. Please call your doctor right away if you gain over 2-3 pounds in one day, or you notice an increase in leg swelling, abdominal swelling or shortness of breath. Making sure you take all of your medications as prescribed and maintaining a normal blood pressure are also important for preventing a CHF exacerbation.      Diagnosis: HLD (hyperlipidemia)  Assessment and Plan of Treatment: Take all medications as prescribed. Follow up with your primary care doctor within two weeks.

## 2021-07-16 NOTE — DISCHARGE NOTE PROVIDER - CARE PROVIDER_API CALL
Yasmany Jimenez)  Surgery; Thoracic and Cardiac Surgery  [address] [place] [address]  Phone: [phone]  Fax: [phone]  Follow Up Time:    Yasmany Jimenez)  Surgery; Thoracic and Cardiac Surgery  [address] [place] [address]  Phone: [phone]  Fax: [phone]  Follow Up Time:     Jayce Baird  [id number]  [address]  [place] [address]  Phone: ([phone]  Fax: ()-  Follow Up Time:

## 2021-07-17 DIAGNOSIS — I48.0 PAROXYSMAL ATRIAL FIBRILLATION: ICD-10-CM

## 2021-07-17 LAB
ALBUMIN SERPL ELPH-MCNC: 3.9 G/DL — SIGNIFICANT CHANGE UP (ref 3.3–5.2)
ALP SERPL-CCNC: 109 U/L — SIGNIFICANT CHANGE UP (ref 40–120)
ALT FLD-CCNC: 40 U/L — HIGH
ANION GAP SERPL CALC-SCNC: 10 MMOL/L — SIGNIFICANT CHANGE UP (ref 5–17)
ANION GAP SERPL CALC-SCNC: 12 MMOL/L — SIGNIFICANT CHANGE UP (ref 5–17)
AST SERPL-CCNC: 72 U/L — HIGH
BILIRUB DIRECT SERPL-MCNC: 0.2 MG/DL — SIGNIFICANT CHANGE UP (ref 0–0.3)
BILIRUB INDIRECT FLD-MCNC: 0.8 MG/DL — SIGNIFICANT CHANGE UP (ref 0.2–1)
BILIRUB SERPL-MCNC: 1 MG/DL — SIGNIFICANT CHANGE UP (ref 0.4–2)
BUN SERPL-MCNC: 15.4 MG/DL — SIGNIFICANT CHANGE UP (ref 8–20)
BUN SERPL-MCNC: 21 MG/DL — HIGH (ref 8–20)
CALCIUM SERPL-MCNC: 8.6 MG/DL — SIGNIFICANT CHANGE UP (ref 8.6–10.2)
CALCIUM SERPL-MCNC: 9.1 MG/DL — SIGNIFICANT CHANGE UP (ref 8.6–10.2)
CHLORIDE SERPL-SCNC: 97 MMOL/L — LOW (ref 98–107)
CHLORIDE SERPL-SCNC: 98 MMOL/L — SIGNIFICANT CHANGE UP (ref 98–107)
CO2 SERPL-SCNC: 22 MMOL/L — SIGNIFICANT CHANGE UP (ref 22–29)
CO2 SERPL-SCNC: 23 MMOL/L — SIGNIFICANT CHANGE UP (ref 22–29)
CREAT SERPL-MCNC: 0.6 MG/DL — SIGNIFICANT CHANGE UP (ref 0.5–1.3)
CREAT SERPL-MCNC: 0.67 MG/DL — SIGNIFICANT CHANGE UP (ref 0.5–1.3)
GLUCOSE BLDC GLUCOMTR-MCNC: 125 MG/DL — HIGH (ref 70–99)
GLUCOSE SERPL-MCNC: 117 MG/DL — HIGH (ref 70–99)
GLUCOSE SERPL-MCNC: 126 MG/DL — HIGH (ref 70–99)
HCT VFR BLD CALC: 29.4 % — LOW (ref 34.5–45)
HCT VFR BLD CALC: 31.8 % — LOW (ref 34.5–45)
HGB BLD-MCNC: 10.4 G/DL — LOW (ref 11.5–15.5)
HGB BLD-MCNC: 9.4 G/DL — LOW (ref 11.5–15.5)
MAGNESIUM SERPL-MCNC: 2.2 MG/DL — SIGNIFICANT CHANGE UP (ref 1.6–2.6)
MAGNESIUM SERPL-MCNC: 2.2 MG/DL — SIGNIFICANT CHANGE UP (ref 1.6–2.6)
MCHC RBC-ENTMCNC: 28.1 PG — SIGNIFICANT CHANGE UP (ref 27–34)
MCHC RBC-ENTMCNC: 28.7 PG — SIGNIFICANT CHANGE UP (ref 27–34)
MCHC RBC-ENTMCNC: 32 GM/DL — SIGNIFICANT CHANGE UP (ref 32–36)
MCHC RBC-ENTMCNC: 32.7 GM/DL — SIGNIFICANT CHANGE UP (ref 32–36)
MCV RBC AUTO: 87.8 FL — SIGNIFICANT CHANGE UP (ref 80–100)
MCV RBC AUTO: 88 FL — SIGNIFICANT CHANGE UP (ref 80–100)
PLATELET # BLD AUTO: 109 K/UL — LOW (ref 150–400)
PLATELET # BLD AUTO: 120 K/UL — LOW (ref 150–400)
POTASSIUM SERPL-MCNC: 4.2 MMOL/L — SIGNIFICANT CHANGE UP (ref 3.5–5.3)
POTASSIUM SERPL-MCNC: 4.5 MMOL/L — SIGNIFICANT CHANGE UP (ref 3.5–5.3)
POTASSIUM SERPL-SCNC: 4.2 MMOL/L — SIGNIFICANT CHANGE UP (ref 3.5–5.3)
POTASSIUM SERPL-SCNC: 4.5 MMOL/L — SIGNIFICANT CHANGE UP (ref 3.5–5.3)
PROT SERPL-MCNC: 6.8 G/DL — SIGNIFICANT CHANGE UP (ref 6.6–8.7)
RBC # BLD: 3.34 M/UL — LOW (ref 3.8–5.2)
RBC # BLD: 3.62 M/UL — LOW (ref 3.8–5.2)
RBC # FLD: 13.9 % — SIGNIFICANT CHANGE UP (ref 10.3–14.5)
RBC # FLD: 14 % — SIGNIFICANT CHANGE UP (ref 10.3–14.5)
SODIUM SERPL-SCNC: 130 MMOL/L — LOW (ref 135–145)
SODIUM SERPL-SCNC: 131 MMOL/L — LOW (ref 135–145)
WBC # BLD: 10.88 K/UL — HIGH (ref 3.8–10.5)
WBC # BLD: 17.33 K/UL — HIGH (ref 3.8–10.5)
WBC # FLD AUTO: 10.88 K/UL — HIGH (ref 3.8–10.5)
WBC # FLD AUTO: 17.33 K/UL — HIGH (ref 3.8–10.5)

## 2021-07-17 PROCEDURE — 74018 RADEX ABDOMEN 1 VIEW: CPT | Mod: 26

## 2021-07-17 PROCEDURE — 93010 ELECTROCARDIOGRAM REPORT: CPT

## 2021-07-17 PROCEDURE — 99223 1ST HOSP IP/OBS HIGH 75: CPT

## 2021-07-17 PROCEDURE — 71045 X-RAY EXAM CHEST 1 VIEW: CPT | Mod: 26

## 2021-07-17 PROCEDURE — 99231 SBSQ HOSP IP/OBS SF/LOW 25: CPT

## 2021-07-17 PROCEDURE — 99221 1ST HOSP IP/OBS SF/LOW 40: CPT | Mod: GC

## 2021-07-17 RX ORDER — LACTULOSE 10 G/15ML
15 SOLUTION ORAL EVERY 6 HOURS
Refills: 0 | Status: DISCONTINUED | OUTPATIENT
Start: 2021-07-17 | End: 2021-07-20

## 2021-07-17 RX ORDER — SIMETHICONE 80 MG/1
80 TABLET, CHEWABLE ORAL
Refills: 0 | Status: DISCONTINUED | OUTPATIENT
Start: 2021-07-17 | End: 2021-07-20

## 2021-07-17 RX ORDER — ALBUMIN HUMAN 25 %
250 VIAL (ML) INTRAVENOUS ONCE
Refills: 0 | Status: COMPLETED | OUTPATIENT
Start: 2021-07-17 | End: 2021-07-17

## 2021-07-17 RX ORDER — IOHEXOL 300 MG/ML
30 INJECTION, SOLUTION INTRAVENOUS ONCE
Refills: 0 | Status: COMPLETED | OUTPATIENT
Start: 2021-07-17 | End: 2021-07-17

## 2021-07-17 RX ADMIN — SIMETHICONE 80 MILLIGRAM(S): 80 TABLET, CHEWABLE ORAL at 18:23

## 2021-07-17 RX ADMIN — Medication 25 MILLIGRAM(S): at 02:26

## 2021-07-17 RX ADMIN — SODIUM CHLORIDE 3 MILLILITER(S): 9 INJECTION INTRAMUSCULAR; INTRAVENOUS; SUBCUTANEOUS at 13:12

## 2021-07-17 RX ADMIN — CLOPIDOGREL BISULFATE 75 MILLIGRAM(S): 75 TABLET, FILM COATED ORAL at 10:09

## 2021-07-17 RX ADMIN — POLYETHYLENE GLYCOL 3350 17 GRAM(S): 17 POWDER, FOR SOLUTION ORAL at 10:09

## 2021-07-17 RX ADMIN — SODIUM CHLORIDE 3 MILLILITER(S): 9 INJECTION INTRAMUSCULAR; INTRAVENOUS; SUBCUTANEOUS at 05:15

## 2021-07-17 RX ADMIN — LACTULOSE 15 GRAM(S): 10 SOLUTION ORAL at 18:14

## 2021-07-17 RX ADMIN — LACTULOSE 15 GRAM(S): 10 SOLUTION ORAL at 13:04

## 2021-07-17 RX ADMIN — AMIODARONE HYDROCHLORIDE 400 MILLIGRAM(S): 400 TABLET ORAL at 10:08

## 2021-07-17 RX ADMIN — PANTOPRAZOLE SODIUM 40 MILLIGRAM(S): 20 TABLET, DELAYED RELEASE ORAL at 10:09

## 2021-07-17 RX ADMIN — SODIUM CHLORIDE 3 MILLILITER(S): 9 INJECTION INTRAMUSCULAR; INTRAVENOUS; SUBCUTANEOUS at 21:45

## 2021-07-17 RX ADMIN — Medication 250 MILLILITER(S): at 00:38

## 2021-07-17 RX ADMIN — AMIODARONE HYDROCHLORIDE 400 MILLIGRAM(S): 400 TABLET ORAL at 13:06

## 2021-07-17 NOTE — CONSULT NOTE ADULT - ASSESSMENT
89 y/o F with PMH HTN, valvular disease (aortic and mitral) who is now POD3 s/p TAVR complained of abdominal pain for one day described as like a sharp band across upper abdomen. Has not had bowel movements in 4 days, while receiving scheduled lactulose. Patient is significantly distended, but not peritonitic. Dilated SB loops on KUB. Patient is continuing to pass flatus and felt improved after manual disimpaction. Passing gas and now with BM. Hemodynamically stable.     #Abdominal Pain   - Please obtain CT A/P with IV and oral contrast  - Draw lactate  - Serial abdominal exams  - consider fleet enemas   - Rest of care per primary.       Plan discussed with Dr. Colón who agrees.   Consulted at 19:52, seen at 20:05. 89 y/o F with PMH HTN, valvular disease (aortic and mitral) who is now POD3 s/p TAVR complained of abdominal pain for one day described as like a sharp band across upper abdomen. Has not had bowel movements in 4 days, while receiving scheduled lactulose. Patient is significantly distended, but not peritonitic. Dilated SB loops on KUB. Patient is continuing to pass flatus and felt improved after manual disimpaction. Passing gas and now with BM. Hemodynamically stable.     #Abdominal Pain   - Please obtain CT A/P with IV and oral contrast  - Draw lactate  - Serial abdominal exams  - Rest of care per primary.       Plan discussed with Dr. Colón who agrees.   Consulted at 19:52, seen at 20:05.

## 2021-07-17 NOTE — CONSULT NOTE ADULT - ASSESSMENT
89 y/o F with PMH HTN, valvular disease(aortic and mitral) with preserved LV Function, prior TIA, who presented for elective TAVR procedure after presenting with dyspnea, performed 7/14/21, post operative transient LBBB noted, and then AF with rapid rates and subsequent conversion pause.       INCOMPLETE 89 y/o F with PMH HTN, valvular disease(aortic and mitral) with preserved LV Function, prior TIA, who presented for elective TAVR procedure after presenting with dyspnea, performed 7/14/21, post operative transient RBBB noted, and then paroxysmal AF with rapid rates and subsequent conversion pauses <=2.5 seconds, and mostly asymptomatic.   Given lack of significant conduction diease or symptomatic bradycardia, as well as pauses <3 seconds which were also asymptomatic, no indication for ppm at this time. However, would avoid too much rate control agents given concern for recurrent pauses and potential syncope in this rather frail woman with baseline low BP.   Will plan for MCOT upon discharge, and if significant recurrent pauses or symptoamtic arrhythmia noted would then need intervention.     -would continue amiodarone at low dose (200mg qd ok) upon discharge, but would stop metoprolol to avoid bradycardia. Can transition back off amio and on low dose metoprolol as an outpt if AF resolves after the post procedure period  -given prior TIA and high thromboembolic risk (CHADSVASc=6) would favor anticoagulation. Could use Eliquis 2.5mg bid instead of plavix (low dose appropriate given her age, weight).   -MCOT upon discharge  -continue telemetry while in house  -cardiology followup with Dr Baird, can see Dr. Saxena in [facility] office as well.

## 2021-07-17 NOTE — CONSULT NOTE ADULT - SUBJECTIVE AND OBJECTIVE BOX
ACUTE CARE SURGERY CONSULT    HPI:  89 y/o F with PMH HTN, valvular disease (aortic and mitral) who is now POD3 s/p TAVR which was post-operatively complicated by a.fib RVR which has converted on amiodarone ACS consulted for abdominal pain that began earlier in day. She initially felt discomfort and distention, but acutely worsened about an hour before evaluation. She feels like a sharp band like pain across upper abdomen. Associated with nausea, but no vomiting, fever, chills, chest pain or SOB. Has been NPO since operation. No BM, but passing flatus. Had been on ATC lactulose. Non-toxic appearing. Patient distended, but not peritonitic.    Due to concern for ischemic pathology, patient was moved from floor back to CTICU with the company of this provider. Continued interview and examination while in CTICU. KUB performed showed no free air. Labs with rising leukocytosis but negative lactate. KYLE with disimpaction provided notable relief for patient.           PAST MEDICAL HISTORY:  HTN (hypertension)    HLD (hyperlipidemia)    TIA (transient ischemic attack)    Aortic stenosis    Mitral regurgitation    Breast cancer        PAST SURGICAL HISTORY:  H/O left mastectomy    H/O cataract extraction        ALLERGIES:  No Known Allergies      FAMILY HISTORY: Noncontributory    SOCIAL HISTORY: Denies tobacco, EtOH, illicit substance use.     HOME MEDICATIONS:    MEDICATIONS  (STANDING):  aMIOdarone    Tablet   Oral   aMIOdarone    Tablet 400 milliGRAM(s) Oral every 8 hours  clopidogrel Tablet 75 milliGRAM(s) Oral daily  lactulose Syrup 15 Gram(s) Oral every 6 hours  pantoprazole    Tablet 40 milliGRAM(s) Oral daily  polyethylene glycol 3350 17 Gram(s) Oral daily  simvastatin 20 milliGRAM(s) Oral at bedtime  sodium chloride 0.9% lock flush 3 milliLiter(s) IV Push every 8 hours    MEDICATIONS  (PRN):  benzocaine 15 mG/menthol 3.6 mG (Sugar-Free) Lozenge 1 Lozenge Oral four times a day PRN Sore Throat  simethicone 80 milliGRAM(s) Chew two times a day PRN Gas      VITALS & I/Os:  Vital Signs Last 24 Hrs  T(C): 36.8 (18 Jul 2021 00:01), Max: 37.2 (17 Jul 2021 20:30)  T(F): 98.3 (18 Jul 2021 00:01), Max: 98.9 (17 Jul 2021 20:30)  HR: 69 (18 Jul 2021 02:00) (60 - 77)  BP: 152/63 (18 Jul 2021 02:00) (96/51 - 152/63)  BP(mean): 90 (18 Jul 2021 02:00) (63 - 91)  RR: 20 (18 Jul 2021 02:00) (17 - 30)  SpO2: 100% (18 Jul 2021 02:00) (93% - 100%)  CAPILLARY BLOOD GLUCOSE      POCT Blood Glucose.: 125 mg/dL (17 Jul 2021 03:56)      I&O's Summary    16 Jul 2021 07:01  -  17 Jul 2021 07:00  --------------------------------------------------------  IN: 720 mL / OUT: 600 mL / NET: 120 mL    17 Jul 2021 07:01  -  18 Jul 2021 03:06  --------------------------------------------------------  IN: 1370 mL / OUT: 0 mL / NET: 1370 mL        GENERAL: Alert, in no acute distress.  MENTAL STATUS: AAOx3. Appropriate affect.  HEENT: EOMI. Full ROM  RESPIRATORY: Unlabored, no conversational dyspnea.   CARDIOVASCULAR: RRR. No audible murmurs, rubs or gallops.   GASTROINTESTINAL: Abdomen soft, significantly distended, non-tender but notes pressure on deep palpation.   KYLE: No masses or bleeding seen.tool in rectal vault, small and hard. Evacuated up to level of reach.   NEUROLOGIC:  No focal neurological deficits. Moves all extremities spontaneously.  INTEGUMENTARY: No overt rashes or lesions, petechia or purpura. Good turgor. No edema.  MUSCULOSKELETAL: No cyanosis or clubbing. No gross deformities.       LABS:                        10.4   17.33 )-----------( 120      ( 17 Jul 2021 19:59 )             31.8     07-17    130<L>  |  97<L>  |  21.0<H>  ----------------------------<  126<H>  4.2   |  22.0  |  0.67    Ca    9.1      17 Jul 2021 19:59  Mg     2.2     07-17    TPro  6.8  /  Alb  3.9  /  TBili  1.0  /  DBili  0.2  /  AST  72<H>  /  ALT  40<H>  /  AlkPhos  109  07-17    Lactate: aMIOdarone    Tablet   Oral   aMIOdarone    Tablet 400 milliGRAM(s) Oral every 8 hours  benzocaine 15 mG/menthol 3.6 mG (Sugar-Free) Lozenge 1 Lozenge Oral four times a day PRN  clopidogrel Tablet 75 milliGRAM(s) Oral daily  lactulose Syrup 15 Gram(s) Oral every 6 hours  pantoprazole    Tablet 40 milliGRAM(s) Oral daily  polyethylene glycol 3350 17 Gram(s) Oral daily  simethicone 80 milliGRAM(s) Chew two times a day PRN  simvastatin 20 milliGRAM(s) Oral at bedtime  sodium chloride 0.9% lock flush 3 milliLiter(s) IV Push every 8 hours   Lactate, Blood: 1.1 mmol/L (07-18 @ 00:32)            IMAGING:  KUB  Distended small bowel loops. No free air under diaphragm.

## 2021-07-17 NOTE — PROGRESS NOTE ADULT - ASSESSMENT
89F, PMH HTN, mod MR/TR, diastolic CHF, CAD, TIA, PAD, breast CA, valvular disease(aortic and mitral) who has been experiencing worsening SOB. Now s/p TF-TAVR via [facility] 7/14 with Dr. Jimenez. Postop course notable for new LBBB, now resolved;

## 2021-07-17 NOTE — CONSULT NOTE ADULT - ATTENDING COMMENTS
Pt seen and examined by me  pt alert, cooperative-states she is much more comfortable, denies pain  no BM in 4 days prior to arrival  abd soft, mildy distended, min tender to deep palpation  no peritoneal signs, no rebound/guarding, no masses  disimpacted by resident with +flatus and relief per patient  CT A/P shows stool burden in rectum, contrast in colon, no evidence inflammatory process or concern for ischemia  lactate 1.0  repeat abd exam in AM  repeat disimpaction due to persistent stool burden  no surgical indication at this time  cont to monitor

## 2021-07-17 NOTE — PROGRESS NOTE ADULT - PROBLEM SELECTOR PLAN 3
AF RVR on lopressor and amio load  SBP 90s overnight so bedtime lopressor dose  held, BP improved now after albumin, to receive lopressor 25x1 now  zio MCOT on discharge x 28 days

## 2021-07-17 NOTE — CONSULT NOTE ADULT - SUBJECTIVE AND OBJECTIVE BOX
HPI:  89 y/o F with PMH HTN, valvular disease(aortic and mitral) with preserved LV Function, prior TIA, who presented for elective TAVR procedure after presenting with dyspnea, performed 7/14/21 with Del Valle Emil valve via right femoral arterial access. The procedure was well tolerated.     She had a LBBB post procedure, that resolved.   Subsequently had artial fibrillation with rapid rates. She was treated with amiodarone and metoprolol.   AF has resolved, and an offset pause was noted.   She has no known prior history of AF.     Pre-procedure Echo (Date, Findings): 2/4/21 normal LV function, mod MR, severe aortic stenosis, mild-mod TR, mild pulmonary HTM    Note: cardiologist Dr Baird, Dr Briggs    PAST MEDICAL & SURGICAL HISTORY:  HTN (hypertension)    HLD (hyperlipidemia)    TIA (transient ischemic attack)    Aortic stenosis    Mitral regurgitation    Breast cancer  1979, just surgery, no chemo or radiation.    H/O left mastectomy    H/O cataract extraction        REVIEW OF SYSTEMS    Allergic/Immunologic:	    MEDICATIONS  (STANDING):  aMIOdarone    Tablet   Oral   aMIOdarone    Tablet 400 milliGRAM(s) Oral every 8 hours  clopidogrel Tablet 75 milliGRAM(s) Oral daily  metoprolol tartrate 25 milliGRAM(s) Oral two times a day  pantoprazole    Tablet 40 milliGRAM(s) Oral daily  polyethylene glycol 3350 17 Gram(s) Oral daily  simvastatin 20 milliGRAM(s) Oral at bedtime  sodium chloride 0.9% lock flush 3 milliLiter(s) IV Push every 8 hours    MEDICATIONS  (PRN):  benzocaine 15 mG/menthol 3.6 mG (Sugar-Free) Lozenge 1 Lozenge Oral four times a day PRN Sore Throat  oxycodone    5 mG/acetaminophen 325 mG 1 Tablet(s) Oral every 6 hours PRN Mild Pain (1 - 3)  oxycodone    5 mG/acetaminophen 325 mG 2 Tablet(s) Oral every 6 hours PRN Moderate Pain (4 - 6)      Allergies    No Known Allergies    Intolerances        SOCIAL HISTORY: denies tob, illicits    FAMILY HISTORY:  No pertinent family history in first degree relatives        Vital Signs Last 24 Hrs  T(C): 36.9 (17 Jul 2021 05:14), Max: 36.9 (16 Jul 2021 15:39)  T(F): 98.5 (17 Jul 2021 05:14), Max: 98.5 (17 Jul 2021 05:14)  HR: 60 (17 Jul 2021 05:14) (60 - 155)  BP: 96/51 (17 Jul 2021 05:14) (94/58 - 152/78)  BP(mean): --  RR: 18 (17 Jul 2021 05:14) (16 - 18)  SpO2: 97% (17 Jul 2021 05:14) (95% - 97%)    Physical Exam:  Constitutional: AAOx3, NAD  Neck: supple, No JVD  Cardiovascular: +S1S2 RRR, no murmurs, rubs, gallops   Pulmonary: CTA b/l, unlabored, no wheezes, rales. rhonci  Abdomen: +BS, soft NTND  Extremities: no edema b/l, +distal pulses b/l  Neuro: non focal, speech clear, TEJADA x 4    LABS:                        9.4    10.88 )-----------( 109      ( 17 Jul 2021 05:11 )             29.4   07-17    131<L>  |  98  |  15.4  ----------------------------<  117<H>  4.5   |  23.0  |  0.60    Ca    8.6      17 Jul 2021 05:11  Mg     2.2     07-17              RADIOLOGY & ADDITIONAL STUDIES:  < from: TTE Echo Complete w/ Contrast w/ Doppler (07.14.21 @ 17:10) >  1. Technically adequate study.   2. Hyperdynamic global left ventricular systolic function.   3. Left ventricular ejection fraction, by visual estimation, is >75%.   4. Spectral Doppler shows pseudonormal pattern of left ventricular myocardial filling (Grade II diastolic dysfunction).   5. Mild thickening and calcification of the posterior mitral valve leaflet.   6. Mild mitral valve regurgitation.   7. Moderate mitral annular calcification. Mean transmitral gradient at Hr of 80 BPM is 4.3 mmHg.   8. Mild-moderate tricuspid regurgitation.   9. Del Valle Emil 3 Transfemoral TAVR in the aortic position.  10. Mild pulmonic valve regurgitation.  11.Peak transaortic gradient equals 15.7 mmHg, mean transaortic gradient equals 8.2 mmHg, Prosthetic valve orifice area is 1.3 cm. DVI is 0.42. AT 89 msec.  12. Estimated pulmonary artery systolic pressure is 68.6 mmHg assuming a right atrial pressure of 15 mmHg, which is consistent with severe pulmonary hypertension.  13. Compared to a TTE from 6/21: LVEF is hyperdynamic. There is no a TAVR in aortic position. LAP and RAP are elevated. PASP which was previously mild in severity is now severe.      < from: TTE Echo Complete w/o Contrast w/ Doppler (06.22.21 @ 12:58) >   2. Normal global left ventricular systolic function.   3. Small LV cavity size.   4. There is mild concentric left ventricular hypertrophy.   5. Spectral Doppler shows pseudonormal pattern of left ventricular myocardial filling (Grade II diastolic dysfunction).   6. Normal right ventricular size and function, estimated PASP least 46 mmHg mildly elevated.   7. Moderately enlarged left atrium.   8. Right atrial enlargement.   9. Moderate mitral annular calcification.  10. Mild thickening and calcification of the posterior mitral valve leaflet.  11. Mild to moderate mitral valve regurgitation.  12. Mild-moderate tricuspid regurgitation.  13. Mild aortic regurgitation.  14. Mild pulmonic valve regurgitation.  15. There is no evidence of pericardial effusion.  16. Severe calcific aortic valve.  17. Peak transaortic gradient equals 131.3 mmHg, mean transaortic gradient equals 76.6 mmHg, dimensionless index 0.12, the calculated aortic valve area equals 0.34 cm² by the continuity equation consistent with critical severe aortic stenosis.    < end of copied text >            < from: Cardiac Cath Lab - Adult (07.14.21 @ 13:54) >  DIAGNOSTIC IMPRESSIONS: Successful deplyment of a 20mm Del Valle-Emil S3  Ultra TAV via a R ight percutaneous trans-femoral approach. 2. Successful  placement and removal of a TPM. 3. Successful vascular closure deployment:  Percloses to R CFA and Angio-seal to L CFA. 4. Please see Dr. Jimenez's  operative report for operative details.  DIAGNOSTIC RECOMMENDATIONS: SAPT with Plavix. 2. Avoid negative chronotropy  for 24-36 hrs. 3. OMT. 4. RFM.  INTERVENTIONAL IMPRESSIONS: Successful deplyment of a 20mm Del Valle-Emil  S3 Ultra TAV via a R ight percutaneous trans-femoral approach. 2.  Successful placement andremoval of a TPM. 3. Successful vascular closure  deployment: Percloses to R CFA and Angio-seal to L CFA. 4. Please see Dr. Jimenez's operative report for operative details.    < end of copied text >   HPI:  89 y/o F with PMH HTN, valvular disease (aortic and mitral) with preserved LV Function, prior TIA, who presented for elective TAVR procedure after presenting with dyspnea, performed 7/14/21 with Del Valle Emil valve via right femoral arterial access. The procedure was well tolerated.     She had a RBBB post procedure, that resolved, and now with evidence of just mild RV conduction delay (baseline narrow QRS).   Subsequently has had paroxysmal atrial fibrillation with rapid rates up to 100-110 bpm, She was treated with amiodarone and metoprolol. Upon conversion of the AF, she has had pauses all <2.5 seconds.   She denies symptoms of palpitation or dizziness.   She has no known prior history of AF. She did have a suspected TIA and for this has been on plavix. She reports episodes of unilateral parethesias, but was also told this was not a stroke.   Currently she has been in sinus rhythm in the 60s bpm, with narrow QRS.     She reports two prior falls, but otherwise is generally stable and active, and does not report a generally unsteady gait or propensity for falls.      Pre-procedure Echo (Date, Findings): 2/4/21 normal LV function, mod MR, severe aortic stenosis, mild-mod TR, mild pulmonary HTM    Note: cardiologist Dr Baird, Dr Briggs    PAST MEDICAL & SURGICAL HISTORY:  HTN (hypertension)    HLD (hyperlipidemia)    TIA (transient ischemic attack)    Aortic stenosis    Mitral regurgitation    Breast cancer  1979, just surgery, no chemo or radiation.    H/O left mastectomy    H/O cataract extraction        REVIEW OF SYSTEMS    Allergic/Immunologic:	    MEDICATIONS  (STANDING):  aMIOdarone    Tablet   Oral   aMIOdarone    Tablet 400 milliGRAM(s) Oral every 8 hours  clopidogrel Tablet 75 milliGRAM(s) Oral daily  metoprolol tartrate 25 milliGRAM(s) Oral two times a day  pantoprazole    Tablet 40 milliGRAM(s) Oral daily  polyethylene glycol 3350 17 Gram(s) Oral daily  simvastatin 20 milliGRAM(s) Oral at bedtime  sodium chloride 0.9% lock flush 3 milliLiter(s) IV Push every 8 hours    MEDICATIONS  (PRN):  benzocaine 15 mG/menthol 3.6 mG (Sugar-Free) Lozenge 1 Lozenge Oral four times a day PRN Sore Throat  oxycodone    5 mG/acetaminophen 325 mG 1 Tablet(s) Oral every 6 hours PRN Mild Pain (1 - 3)  oxycodone    5 mG/acetaminophen 325 mG 2 Tablet(s) Oral every 6 hours PRN Moderate Pain (4 - 6)      Allergies    No Known Allergies    Intolerances        SOCIAL HISTORY: denies tob, illicits    FAMILY HISTORY:  No pertinent family history in first degree relatives        Vital Signs Last 24 Hrs  T(C): 36.9 (17 Jul 2021 05:14), Max: 36.9 (16 Jul 2021 15:39)  T(F): 98.5 (17 Jul 2021 05:14), Max: 98.5 (17 Jul 2021 05:14)  HR: 60 (17 Jul 2021 05:14) (60 - 155)  BP: 96/51 (17 Jul 2021 05:14) (94/58 - 152/78)  BP(mean): --  RR: 18 (17 Jul 2021 05:14) (16 - 18)  SpO2: 97% (17 Jul 2021 05:14) (95% - 97%)    Physical Exam:  Constitutional: AAOx3, NAD  Neck: supple, No JVD  Cardiovascular: +S1S2 RRR, no murmurs, rubs, gallops   Pulmonary: CTA b/l, unlabored, no wheezes, rales. rhonci  Abdomen: +BS, soft NTND  Extremities: no edema b/l, +distal pulses b/l  Neuro: non focal, speech clear, TEJADA x 4    LABS:                        9.4    10.88 )-----------( 109      ( 17 Jul 2021 05:11 )             29.4   07-17    131<L>  |  98  |  15.4  ----------------------------<  117<H>  4.5   |  23.0  |  0.60    Ca    8.6      17 Jul 2021 05:11  Mg     2.2     07-17      ECG sinus rhythm 60 bpm, narrow-gennaro QRS with mild RV condcution delay.   prior ECG: AF with rates 120s bpm, and RBBB        RADIOLOGY & ADDITIONAL STUDIES:  < from: TTE Echo Complete w/ Contrast w/ Doppler (07.14.21 @ 17:10) >  1. Technically adequate study.   2. Hyperdynamic global left ventricular systolic function.   3. Left ventricular ejection fraction, by visual estimation, is >75%.   4. Spectral Doppler shows pseudonormal pattern of left ventricular myocardial filling (Grade II diastolic dysfunction).   5. Mild thickening and calcification of the posterior mitral valve leaflet.   6. Mild mitral valve regurgitation.   7. Moderate mitral annular calcification. Mean transmitral gradient at Hr of 80 BPM is 4.3 mmHg.   8. Mild-moderate tricuspid regurgitation.   9. Del Valle Emil 3 Transfemoral TAVR in the aortic position.  10. Mild pulmonic valve regurgitation.  11.Peak transaortic gradient equals 15.7 mmHg, mean transaortic gradient equals 8.2 mmHg, Prosthetic valve orifice area is 1.3 cm. DVI is 0.42. AT 89 msec.  12. Estimated pulmonary artery systolic pressure is 68.6 mmHg assuming a right atrial pressure of 15 mmHg, which is consistent with severe pulmonary hypertension.  13. Compared to a TTE from 6/21: LVEF is hyperdynamic. There is no a TAVR in aortic position. LAP and RAP are elevated. PASP which was previously mild in severity is now severe.      < from: TTE Echo Complete w/o Contrast w/ Doppler (06.22.21 @ 12:58) >   2. Normal global left ventricular systolic function.   3. Small LV cavity size.   4. There is mild concentric left ventricular hypertrophy.   5. Spectral Doppler shows pseudonormal pattern of left ventricular myocardial filling (Grade II diastolic dysfunction).   6. Normal right ventricular size and function, estimated PASP least 46 mmHg mildly elevated.   7. Moderately enlarged left atrium.   8. Right atrial enlargement.   9. Moderate mitral annular calcification.  10. Mild thickening and calcification of the posterior mitral valve leaflet.  11. Mild to moderate mitral valve regurgitation.  12. Mild-moderate tricuspid regurgitation.  13. Mild aortic regurgitation.  14. Mild pulmonic valve regurgitation.  15. There is no evidence of pericardial effusion.  16. Severe calcific aortic valve.  17. Peak transaortic gradient equals 131.3 mmHg, mean transaortic gradient equals 76.6 mmHg, dimensionless index 0.12, the calculated aortic valve area equals 0.34 cm² by the continuity equation consistent with critical severe aortic stenosis.    < end of copied text >            < from: Cardiac Cath Lab - Adult (07.14.21 @ 13:54) >  DIAGNOSTIC IMPRESSIONS: Successful deplyment of a 20mm Del Valle-Emil S3  Ultra TAV via a R ight percutaneous trans-femoral approach. 2. Successful  placement and removal of a TPM. 3. Successful vascular closure deployment:  Percloses to R CFA and Angio-seal to L CFA. 4. Please see Dr. Jimenez's  operative report for operative details.  DIAGNOSTIC RECOMMENDATIONS: SAPT with Plavix. 2. Avoid negative chronotropy  for 24-36 hrs. 3. OMT. 4. RFM.  INTERVENTIONAL IMPRESSIONS: Successful deplyment of a 20mm Del Valle-Emil  S3 Ultra TAV via a R ight percutaneous trans-femoral approach. 2.  Successful placement andremoval of a TPM. 3. Successful vascular closure  deployment: Percloses to R CFA and Angio-seal to L CFA. 4. Please see Dr. Jimenez's operative report for operative details.    < end of copied text >

## 2021-07-18 DIAGNOSIS — I50.32 CHRONIC DIASTOLIC (CONGESTIVE) HEART FAILURE: ICD-10-CM

## 2021-07-18 DIAGNOSIS — R14.0 ABDOMINAL DISTENSION (GASEOUS): ICD-10-CM

## 2021-07-18 LAB
ANION GAP SERPL CALC-SCNC: 14 MMOL/L — SIGNIFICANT CHANGE UP (ref 5–17)
BUN SERPL-MCNC: 17.6 MG/DL — SIGNIFICANT CHANGE UP (ref 8–20)
CALCIUM SERPL-MCNC: 8.8 MG/DL — SIGNIFICANT CHANGE UP (ref 8.6–10.2)
CHLORIDE SERPL-SCNC: 93 MMOL/L — LOW (ref 98–107)
CO2 SERPL-SCNC: 22 MMOL/L — SIGNIFICANT CHANGE UP (ref 22–29)
CREAT SERPL-MCNC: 0.65 MG/DL — SIGNIFICANT CHANGE UP (ref 0.5–1.3)
GLUCOSE SERPL-MCNC: 133 MG/DL — HIGH (ref 70–99)
HCT VFR BLD CALC: 30.7 % — LOW (ref 34.5–45)
HGB BLD-MCNC: 9.9 G/DL — LOW (ref 11.5–15.5)
LACTATE SERPL-SCNC: 1.1 MMOL/L — SIGNIFICANT CHANGE UP (ref 0.5–2)
MAGNESIUM SERPL-MCNC: 2 MG/DL — SIGNIFICANT CHANGE UP (ref 1.6–2.6)
MCHC RBC-ENTMCNC: 28.1 PG — SIGNIFICANT CHANGE UP (ref 27–34)
MCHC RBC-ENTMCNC: 32.2 GM/DL — SIGNIFICANT CHANGE UP (ref 32–36)
MCV RBC AUTO: 87.2 FL — SIGNIFICANT CHANGE UP (ref 80–100)
PLATELET # BLD AUTO: 98 K/UL — LOW (ref 150–400)
POTASSIUM SERPL-MCNC: 3.9 MMOL/L — SIGNIFICANT CHANGE UP (ref 3.5–5.3)
POTASSIUM SERPL-SCNC: 3.9 MMOL/L — SIGNIFICANT CHANGE UP (ref 3.5–5.3)
RBC # BLD: 3.52 M/UL — LOW (ref 3.8–5.2)
RBC # FLD: 13.8 % — SIGNIFICANT CHANGE UP (ref 10.3–14.5)
SODIUM SERPL-SCNC: 128 MMOL/L — LOW (ref 135–145)
WBC # BLD: 15.01 K/UL — HIGH (ref 3.8–10.5)
WBC # FLD AUTO: 15.01 K/UL — HIGH (ref 3.8–10.5)

## 2021-07-18 PROCEDURE — 74176 CT ABD & PELVIS W/O CONTRAST: CPT | Mod: 26

## 2021-07-18 PROCEDURE — 99232 SBSQ HOSP IP/OBS MODERATE 35: CPT

## 2021-07-18 RX ORDER — PANTOPRAZOLE SODIUM 20 MG/1
40 TABLET, DELAYED RELEASE ORAL DAILY
Refills: 0 | Status: DISCONTINUED | OUTPATIENT
Start: 2021-07-18 | End: 2021-07-20

## 2021-07-18 RX ORDER — ENOXAPARIN SODIUM 100 MG/ML
30 INJECTION SUBCUTANEOUS DAILY
Refills: 0 | Status: DISCONTINUED | OUTPATIENT
Start: 2021-07-18 | End: 2021-07-20

## 2021-07-18 RX ORDER — ONDANSETRON 8 MG/1
4 TABLET, FILM COATED ORAL ONCE
Refills: 0 | Status: COMPLETED | OUTPATIENT
Start: 2021-07-18 | End: 2021-07-18

## 2021-07-18 RX ORDER — SENNA PLUS 8.6 MG/1
2 TABLET ORAL AT BEDTIME
Refills: 0 | Status: DISCONTINUED | OUTPATIENT
Start: 2021-07-18 | End: 2021-07-20

## 2021-07-18 RX ADMIN — AMIODARONE HYDROCHLORIDE 400 MILLIGRAM(S): 400 TABLET ORAL at 13:54

## 2021-07-18 RX ADMIN — AMIODARONE HYDROCHLORIDE 400 MILLIGRAM(S): 400 TABLET ORAL at 21:05

## 2021-07-18 RX ADMIN — SODIUM CHLORIDE 3 MILLILITER(S): 9 INJECTION INTRAMUSCULAR; INTRAVENOUS; SUBCUTANEOUS at 11:38

## 2021-07-18 RX ADMIN — SENNA PLUS 2 TABLET(S): 8.6 TABLET ORAL at 21:05

## 2021-07-18 RX ADMIN — CLOPIDOGREL BISULFATE 75 MILLIGRAM(S): 75 TABLET, FILM COATED ORAL at 11:37

## 2021-07-18 RX ADMIN — SODIUM CHLORIDE 3 MILLILITER(S): 9 INJECTION INTRAMUSCULAR; INTRAVENOUS; SUBCUTANEOUS at 06:30

## 2021-07-18 RX ADMIN — PANTOPRAZOLE SODIUM 40 MILLIGRAM(S): 20 TABLET, DELAYED RELEASE ORAL at 11:37

## 2021-07-18 RX ADMIN — LACTULOSE 15 GRAM(S): 10 SOLUTION ORAL at 17:09

## 2021-07-18 RX ADMIN — SIMVASTATIN 20 MILLIGRAM(S): 20 TABLET, FILM COATED ORAL at 21:05

## 2021-07-18 RX ADMIN — LACTULOSE 15 GRAM(S): 10 SOLUTION ORAL at 11:37

## 2021-07-18 RX ADMIN — SODIUM CHLORIDE 3 MILLILITER(S): 9 INJECTION INTRAMUSCULAR; INTRAVENOUS; SUBCUTANEOUS at 21:03

## 2021-07-18 RX ADMIN — POLYETHYLENE GLYCOL 3350 17 GRAM(S): 17 POWDER, FOR SOLUTION ORAL at 11:37

## 2021-07-18 RX ADMIN — AMIODARONE HYDROCHLORIDE 400 MILLIGRAM(S): 400 TABLET ORAL at 02:01

## 2021-07-18 RX ADMIN — ONDANSETRON 4 MILLIGRAM(S): 8 TABLET, FILM COATED ORAL at 03:14

## 2021-07-18 RX ADMIN — AMIODARONE HYDROCHLORIDE 400 MILLIGRAM(S): 400 TABLET ORAL at 06:30

## 2021-07-18 NOTE — CHART NOTE - NSCHARTNOTEFT_GEN_A_CORE
CTS ACP Addendum    Briefly, 89F, PMH HTN, mod MR/TR, diastolic CHF, CAD, TIA, PAD, breast CA, valvular disease(aortic and mitral) who has been experiencing worsening SOB. Now s/p TF-TAVR via [facility] 7/14 with Dr. Jimenez. Postop course notable for new LBBB, now resolved; on 7/15 pt had burst of AF with abherrancy vs VT, Lopressor started and on 7/16, pt developed AF with RVR 170s x 2 hrs.     Patient seen and examined. Notes, flowsheets, medications, radiologic images and labs reviewed. On exam, diminished bs b/l bases, JONATHAN, mildly distended, + BS, no BM postop + flatus, no edema. Labs wnl. On telemetry: AF with -150s converted at 1am s/p conversion pauses. Currently SR. Requires 2 L NC intermittently overnight.     Plan:  - OOB, ambulate, chest PT, Incentive Spirometry encouraged  - Stop Lopressor per EP, continue Amio PO load  - Lactulose around the clock until BM  - No A/C for high risk of bleeding   - Home to discharge Monday to home    Case discussed with Dr. Jimenez
As per Dr. Jimenez patient will remain on plavix only at this time in light of age, unsteady gait, fragility and her increased risk for falling.  Will remain off Eliquis.
Called to bedside by RN. Patient now with intractable abominal pain.  On exam bowel sounds normal, however noted to have distended and tympanic abdomen. Firm to palpation with exsquisite tenderness to upper abdomen.  Plan for STAT labs, serial abdominal exams, STAT xray abdomen, and CT abdomen/pelvis.  General surgery consult called.   Likely upgrade to CTICU.
Commercial 20mm Del Valle Emil 3 Ultra Percutaneous Transfemoral TAVR via Right Common Femoral Artery.  NCT# [id number], STS/ACC TVT Registry Patient ID# [id number].
Pt went into Rapid AFIB , 's asymptomatic, pt OOB to the chair eating dinner, no complaints. Lopressor 5 IVP given ,  bolus given followed by Amio IVBP 150 bolus and PO load started.   Pt remains asymptomatic, with a reduced rate to the 100's to 120's.    plan of care d/w Dr. Jimenez

## 2021-07-18 NOTE — CHART NOTE - NSCHARTNOTESELECT_GEN_ALL_CORE
ACP/Event Note
CT Surgery/Event Note
TAVR Documentation Note/Event Note
progress note/Event Note
Event Note

## 2021-07-18 NOTE — PROGRESS NOTE ADULT - PROBLEM SELECTOR PLAN 4
EP Consulted - no need for PPM at this time.  Conversion pauses noted < 3 seconds, RBBB resolved  Lopressor d/c'd, Continue Amio PO  zio MCOT on discharge x 28 days  Currently in NSR Lopressor d/c'd secondary to hypotension

## 2021-07-18 NOTE — PROGRESS NOTE ADULT - ASSESSMENT
89 y/o F with PMH HTN, valvular disease (aortic and mitral) who is s/p TAVR with abdominal pain that has been improving since fecal disimpaction and continued flatus and BM through evening. CT shows significant stool burden. Would benefit from additional disimpaction. HD stable.     #Abdominal Pain   - Additional disimpaction.  - Bowel regimen  - Rest of care per primary.

## 2021-07-18 NOTE — DIETITIAN INITIAL EVALUATION ADULT. - PERTINENT LABORATORY DATA
07-18 Na128 mmol/L<L> Glu 133 mg/dL<H> K+ 3.9 mmol/L Cr  0.65 mg/dL BUN 17.6 mg/dL Phos n/a   Alb n/a   PAB n/a     n/a  HgbA1C

## 2021-07-18 NOTE — DIETITIAN INITIAL EVALUATION ADULT. - ETIOLOGY
Related to inability to meet sufficient protein energy requirements in setting of advanced age w/ valvular disease(aortic and mitral; s/p TAVR

## 2021-07-18 NOTE — PROGRESS NOTE ADULT - ASSESSMENT
89F, PMH HTN, mod MR/TR, diastolic CHF, CAD, TIA, PAD, breast CA, valvular disease(aortic and mitral) who has been experiencing worsening SOB. Now s/p TF-TAVR via [facility] 7/14 with Dr. Jimenez. Postop course notable for new RBBB, now resolved.  On 7/17 late pm pt transferred back to ICU with c/o abdominal pain and distended abdomen.

## 2021-07-19 ENCOUNTER — NON-APPOINTMENT (OUTPATIENT)
Age: 86
End: 2021-07-19

## 2021-07-19 LAB
ANION GAP SERPL CALC-SCNC: 9 MMOL/L — SIGNIFICANT CHANGE UP (ref 5–17)
BUN SERPL-MCNC: 20.8 MG/DL — HIGH (ref 8–20)
CALCIUM SERPL-MCNC: 8.6 MG/DL — SIGNIFICANT CHANGE UP (ref 8.6–10.2)
CHLORIDE SERPL-SCNC: 97 MMOL/L — LOW (ref 98–107)
CO2 SERPL-SCNC: 25 MMOL/L — SIGNIFICANT CHANGE UP (ref 22–29)
CREAT SERPL-MCNC: 0.72 MG/DL — SIGNIFICANT CHANGE UP (ref 0.5–1.3)
GLUCOSE SERPL-MCNC: 109 MG/DL — HIGH (ref 70–99)
HCT VFR BLD CALC: 30.8 % — LOW (ref 34.5–45)
HGB BLD-MCNC: 9.9 G/DL — LOW (ref 11.5–15.5)
MAGNESIUM SERPL-MCNC: 2.3 MG/DL — SIGNIFICANT CHANGE UP (ref 1.6–2.6)
MCHC RBC-ENTMCNC: 28.4 PG — SIGNIFICANT CHANGE UP (ref 27–34)
MCHC RBC-ENTMCNC: 32.1 GM/DL — SIGNIFICANT CHANGE UP (ref 32–36)
MCV RBC AUTO: 88.3 FL — SIGNIFICANT CHANGE UP (ref 80–100)
OSMOLALITY SERPL: 281 MOSMOL/KG — SIGNIFICANT CHANGE UP (ref 280–301)
OSMOLALITY UR: 632 MOSM/KG — SIGNIFICANT CHANGE UP (ref 300–1000)
PHOSPHATE SERPL-MCNC: 2.6 MG/DL — SIGNIFICANT CHANGE UP (ref 2.4–4.7)
PLATELET # BLD AUTO: 119 K/UL — LOW (ref 150–400)
POTASSIUM SERPL-MCNC: 4.6 MMOL/L — SIGNIFICANT CHANGE UP (ref 3.5–5.3)
POTASSIUM SERPL-SCNC: 4.6 MMOL/L — SIGNIFICANT CHANGE UP (ref 3.5–5.3)
RBC # BLD: 3.49 M/UL — LOW (ref 3.8–5.2)
RBC # FLD: 14 % — SIGNIFICANT CHANGE UP (ref 10.3–14.5)
SODIUM SERPL-SCNC: 131 MMOL/L — LOW (ref 135–145)
WBC # BLD: 12.58 K/UL — HIGH (ref 3.8–10.5)
WBC # FLD AUTO: 12.58 K/UL — HIGH (ref 3.8–10.5)

## 2021-07-19 PROCEDURE — 99233 SBSQ HOSP IP/OBS HIGH 50: CPT

## 2021-07-19 PROCEDURE — 93010 ELECTROCARDIOGRAM REPORT: CPT

## 2021-07-19 PROCEDURE — 99231 SBSQ HOSP IP/OBS SF/LOW 25: CPT

## 2021-07-19 PROCEDURE — 71045 X-RAY EXAM CHEST 1 VIEW: CPT | Mod: 26

## 2021-07-19 RX ORDER — METOPROLOL TARTRATE 50 MG
2.5 TABLET ORAL ONCE
Refills: 0 | Status: COMPLETED | OUTPATIENT
Start: 2021-07-19 | End: 2021-07-19

## 2021-07-19 RX ORDER — AMIODARONE HYDROCHLORIDE 400 MG/1
200 TABLET ORAL DAILY
Refills: 0 | Status: DISCONTINUED | OUTPATIENT
Start: 2021-07-20 | End: 2021-07-20

## 2021-07-19 RX ADMIN — PANTOPRAZOLE SODIUM 40 MILLIGRAM(S): 20 TABLET, DELAYED RELEASE ORAL at 12:36

## 2021-07-19 RX ADMIN — SIMVASTATIN 20 MILLIGRAM(S): 20 TABLET, FILM COATED ORAL at 21:04

## 2021-07-19 RX ADMIN — SODIUM CHLORIDE 3 MILLILITER(S): 9 INJECTION INTRAMUSCULAR; INTRAVENOUS; SUBCUTANEOUS at 12:41

## 2021-07-19 RX ADMIN — AMIODARONE HYDROCHLORIDE 400 MILLIGRAM(S): 400 TABLET ORAL at 12:36

## 2021-07-19 RX ADMIN — AMIODARONE HYDROCHLORIDE 400 MILLIGRAM(S): 400 TABLET ORAL at 06:03

## 2021-07-19 RX ADMIN — SODIUM CHLORIDE 3 MILLILITER(S): 9 INJECTION INTRAMUSCULAR; INTRAVENOUS; SUBCUTANEOUS at 06:03

## 2021-07-19 RX ADMIN — POLYETHYLENE GLYCOL 3350 17 GRAM(S): 17 POWDER, FOR SOLUTION ORAL at 12:36

## 2021-07-19 RX ADMIN — SODIUM CHLORIDE 3 MILLILITER(S): 9 INJECTION INTRAMUSCULAR; INTRAVENOUS; SUBCUTANEOUS at 21:11

## 2021-07-19 RX ADMIN — SENNA PLUS 2 TABLET(S): 8.6 TABLET ORAL at 21:04

## 2021-07-19 RX ADMIN — CLOPIDOGREL BISULFATE 75 MILLIGRAM(S): 75 TABLET, FILM COATED ORAL at 12:36

## 2021-07-19 RX ADMIN — ENOXAPARIN SODIUM 30 MILLIGRAM(S): 100 INJECTION SUBCUTANEOUS at 21:04

## 2021-07-19 RX ADMIN — Medication 2.5 MILLIGRAM(S): at 09:15

## 2021-07-19 NOTE — PROGRESS NOTE ADULT - ASSESSMENT
89 year old female with history of recent TAVR admitted for dyspnea on exertion with improved abdominal pain. having small bowel function    -continue bowel regimen  -continue diet  -encourage ambulation  -rest of care as per prmary team  -no acute surgical intervention  89 year old female with history of recent TAVR admitted for dyspnea on exertion with improved abdominal pain. having small bowel function    -continue bowel regimen  -continue diet  -encourage ambulation  -rest of care as per prmary team  -surgery will sign off at this time, please reconsult as needed

## 2021-07-19 NOTE — PROGRESS NOTE ADULT - ATTENDING COMMENTS
Recurrent symptomatic AF noted today, and pt reported sensation of weakness briefly. Was not on telemetry at time to determine if offset pause occurred- now in sinus rhythm in 50s bpm.     would lower amiodarone to 200mg qd, and remain OFF additional rate control agents.   continue telemetry monitoring. if recurrent symptomatic pauses noted despite changes above, would do ppm prior to discharge.   Otherwise, plan for discharge with MCOT.
The patient was seen and examined  Events noted  No new problems  The patient had no abdominal pain  She is tolerating her diet  Consider checking thyroid functions  Will sign off  Please re-consult as needed

## 2021-07-19 NOTE — PROGRESS NOTE ADULT - ASSESSMENT
89F, PMH HTN, mod MR/TR, diastolic CHF, CAD, TIA, PAD, breast CA, valvular disease(aortic and mitral) who has been experiencing worsening SOB. Now s/p TF-TAVR via FA 7/14 with Dr. Jimenez. Postop course notable for new RBBB (now resolved), PAF (now SB/SR), constipation (resolved s/p disimpaction and + BM).

## 2021-07-19 NOTE — PROGRESS NOTE ADULT - ASSESSMENT
89 y/o F with PMH HTN, valvular disease(aortic and mitral) with preserved LV Function, prior TIA, s/p elective TAVR 7/14/21 with post operative transient RBBB, paroxysmal AF with rapid rates and subsequent conversion pauses <=2.5 seconds. Also w/ recurrent AF briefly this AM, which converted spontaneously. Notably, pt was off monitor for transport at the time of conversion, but did not have any associated symptoms.  Given lack of significant conduction diease or symptomatic bradycardia, as well as low risk (<3 sec) and asymptomatic conversion pauses, no indication for ppm at this time. However, would minimize rate control agents given concern for recurrent pauses and potential syncope in this rather frail woman with baseline low BP.     Recommendations:   Given prior TIA and high thromboembolic risk (CHADSVASc=6: HTN, age x 2, female, prior TIA) would favor therapeutic anticoagulation such as Eliquis 2.5mg bid (low dose for age & weight). However, per CTSx, pt will be maintained on Plavix only for now given high risk of bleeding complication in this frail, elderly patient.   Continue amiodarone - can decrease to maintenance dose of 200mg daily  Continue to avoid beta blocker therapy to avoid bradycardia. Would transition off amio and back on to low dose metoprolol as outpt if no significant AF burden following post procedure recovery.   MCOT upon discharge  Continue telemetry while in house  Cardiology followup with Dr Baird, can see Dr. Saxena in [facility] Cardiology office as well.   Will d/w Dr. Mathis; further recs to follow.

## 2021-07-20 ENCOUNTER — TRANSCRIPTION ENCOUNTER (OUTPATIENT)
Age: 86
End: 2021-07-20

## 2021-07-20 VITALS
HEART RATE: 67 BPM | SYSTOLIC BLOOD PRESSURE: 168 MMHG | DIASTOLIC BLOOD PRESSURE: 67 MMHG | RESPIRATION RATE: 18 BRPM | TEMPERATURE: 98 F | OXYGEN SATURATION: 93 %

## 2021-07-20 LAB
ANION GAP SERPL CALC-SCNC: 9 MMOL/L — SIGNIFICANT CHANGE UP (ref 5–17)
BUN SERPL-MCNC: 22.1 MG/DL — HIGH (ref 8–20)
CALCIUM SERPL-MCNC: 8.9 MG/DL — SIGNIFICANT CHANGE UP (ref 8.6–10.2)
CHLORIDE SERPL-SCNC: 99 MMOL/L — SIGNIFICANT CHANGE UP (ref 98–107)
CO2 SERPL-SCNC: 26 MMOL/L — SIGNIFICANT CHANGE UP (ref 22–29)
CREAT SERPL-MCNC: 0.7 MG/DL — SIGNIFICANT CHANGE UP (ref 0.5–1.3)
GLUCOSE SERPL-MCNC: 102 MG/DL — HIGH (ref 70–99)
HCT VFR BLD CALC: 29.4 % — LOW (ref 34.5–45)
HGB BLD-MCNC: 9.7 G/DL — LOW (ref 11.5–15.5)
MAGNESIUM SERPL-MCNC: 2 MG/DL — SIGNIFICANT CHANGE UP (ref 1.6–2.6)
MCHC RBC-ENTMCNC: 28.5 PG — SIGNIFICANT CHANGE UP (ref 27–34)
MCHC RBC-ENTMCNC: 33 GM/DL — SIGNIFICANT CHANGE UP (ref 32–36)
MCV RBC AUTO: 86.5 FL — SIGNIFICANT CHANGE UP (ref 80–100)
PHOSPHATE SERPL-MCNC: 2.7 MG/DL — SIGNIFICANT CHANGE UP (ref 2.4–4.7)
PLATELET # BLD AUTO: 141 K/UL — LOW (ref 150–400)
POTASSIUM SERPL-MCNC: 5.1 MMOL/L — SIGNIFICANT CHANGE UP (ref 3.5–5.3)
POTASSIUM SERPL-SCNC: 5.1 MMOL/L — SIGNIFICANT CHANGE UP (ref 3.5–5.3)
RBC # BLD: 3.4 M/UL — LOW (ref 3.8–5.2)
RBC # FLD: 13.9 % — SIGNIFICANT CHANGE UP (ref 10.3–14.5)
SODIUM SERPL-SCNC: 134 MMOL/L — LOW (ref 135–145)
WBC # BLD: 12.23 K/UL — HIGH (ref 3.8–10.5)
WBC # FLD AUTO: 12.23 K/UL — HIGH (ref 3.8–10.5)

## 2021-07-20 PROCEDURE — C1889: CPT

## 2021-07-20 PROCEDURE — 71045 X-RAY EXAM CHEST 1 VIEW: CPT | Mod: 26

## 2021-07-20 PROCEDURE — C8929: CPT

## 2021-07-20 PROCEDURE — 82962 GLUCOSE BLOOD TEST: CPT

## 2021-07-20 PROCEDURE — 74018 RADEX ABDOMEN 1 VIEW: CPT

## 2021-07-20 PROCEDURE — 85018 HEMOGLOBIN: CPT

## 2021-07-20 PROCEDURE — 84132 ASSAY OF SERUM POTASSIUM: CPT

## 2021-07-20 PROCEDURE — 76000 FLUOROSCOPY <1 HR PHYS/QHP: CPT

## 2021-07-20 PROCEDURE — 86769 SARS-COV-2 COVID-19 ANTIBODY: CPT

## 2021-07-20 PROCEDURE — 99238 HOSP IP/OBS DSCHRG MGMT 30/<: CPT

## 2021-07-20 PROCEDURE — 97116 GAIT TRAINING THERAPY: CPT

## 2021-07-20 PROCEDURE — C1887: CPT

## 2021-07-20 PROCEDURE — 83930 ASSAY OF BLOOD OSMOLALITY: CPT

## 2021-07-20 PROCEDURE — 97530 THERAPEUTIC ACTIVITIES: CPT

## 2021-07-20 PROCEDURE — 74176 CT ABD & PELVIS W/O CONTRAST: CPT

## 2021-07-20 PROCEDURE — 85027 COMPLETE CBC AUTOMATED: CPT

## 2021-07-20 PROCEDURE — 83735 ASSAY OF MAGNESIUM: CPT

## 2021-07-20 PROCEDURE — 93005 ELECTROCARDIOGRAM TRACING: CPT

## 2021-07-20 PROCEDURE — 84295 ASSAY OF SERUM SODIUM: CPT

## 2021-07-20 PROCEDURE — 83605 ASSAY OF LACTIC ACID: CPT

## 2021-07-20 PROCEDURE — 80053 COMPREHEN METABOLIC PANEL: CPT

## 2021-07-20 PROCEDURE — 82435 ASSAY OF BLOOD CHLORIDE: CPT

## 2021-07-20 PROCEDURE — 83935 ASSAY OF URINE OSMOLALITY: CPT

## 2021-07-20 PROCEDURE — 82330 ASSAY OF CALCIUM: CPT

## 2021-07-20 PROCEDURE — C1894: CPT

## 2021-07-20 PROCEDURE — C1725: CPT

## 2021-07-20 PROCEDURE — 71045 X-RAY EXAM CHEST 1 VIEW: CPT

## 2021-07-20 PROCEDURE — C1760: CPT

## 2021-07-20 PROCEDURE — 85025 COMPLETE CBC W/AUTO DIFF WBC: CPT

## 2021-07-20 PROCEDURE — 80048 BASIC METABOLIC PNL TOTAL CA: CPT

## 2021-07-20 PROCEDURE — 99233 SBSQ HOSP IP/OBS HIGH 50: CPT

## 2021-07-20 PROCEDURE — C1769: CPT

## 2021-07-20 PROCEDURE — 85730 THROMBOPLASTIN TIME PARTIAL: CPT

## 2021-07-20 PROCEDURE — 82803 BLOOD GASES ANY COMBINATION: CPT

## 2021-07-20 PROCEDURE — 80076 HEPATIC FUNCTION PANEL: CPT

## 2021-07-20 PROCEDURE — 97163 PT EVAL HIGH COMPLEX 45 MIN: CPT

## 2021-07-20 PROCEDURE — 85014 HEMATOCRIT: CPT

## 2021-07-20 PROCEDURE — P9045: CPT

## 2021-07-20 PROCEDURE — 84100 ASSAY OF PHOSPHORUS: CPT

## 2021-07-20 PROCEDURE — 82947 ASSAY GLUCOSE BLOOD QUANT: CPT

## 2021-07-20 PROCEDURE — 93010 ELECTROCARDIOGRAM REPORT: CPT

## 2021-07-20 PROCEDURE — 85610 PROTHROMBIN TIME: CPT

## 2021-07-20 PROCEDURE — 36415 COLL VENOUS BLD VENIPUNCTURE: CPT

## 2021-07-20 RX ORDER — PANTOPRAZOLE SODIUM 20 MG/1
1 TABLET, DELAYED RELEASE ORAL
Qty: 30 | Refills: 0
Start: 2021-07-20 | End: 2021-08-18

## 2021-07-20 RX ORDER — SENNA PLUS 8.6 MG/1
2 TABLET ORAL
Qty: 60 | Refills: 0
Start: 2021-07-20 | End: 2021-08-18

## 2021-07-20 RX ORDER — AMIODARONE HYDROCHLORIDE 400 MG/1
1 TABLET ORAL
Qty: 30 | Refills: 1
Start: 2021-07-20 | End: 2021-09-17

## 2021-07-20 RX ORDER — SIMVASTATIN 20 MG/1
1 TABLET, FILM COATED ORAL
Qty: 30 | Refills: 1
Start: 2021-07-20 | End: 2021-09-17

## 2021-07-20 RX ORDER — CLOPIDOGREL BISULFATE 75 MG/1
1 TABLET, FILM COATED ORAL
Qty: 30 | Refills: 1
Start: 2021-07-20 | End: 2021-09-17

## 2021-07-20 RX ADMIN — SODIUM CHLORIDE 3 MILLILITER(S): 9 INJECTION INTRAMUSCULAR; INTRAVENOUS; SUBCUTANEOUS at 05:27

## 2021-07-20 RX ADMIN — PANTOPRAZOLE SODIUM 40 MILLIGRAM(S): 20 TABLET, DELAYED RELEASE ORAL at 08:28

## 2021-07-20 RX ADMIN — POLYETHYLENE GLYCOL 3350 17 GRAM(S): 17 POWDER, FOR SOLUTION ORAL at 08:28

## 2021-07-20 RX ADMIN — AMIODARONE HYDROCHLORIDE 200 MILLIGRAM(S): 400 TABLET ORAL at 05:28

## 2021-07-20 RX ADMIN — CLOPIDOGREL BISULFATE 75 MILLIGRAM(S): 75 TABLET, FILM COATED ORAL at 08:28

## 2021-07-20 RX ADMIN — SODIUM CHLORIDE 3 MILLILITER(S): 9 INJECTION INTRAMUSCULAR; INTRAVENOUS; SUBCUTANEOUS at 13:27

## 2021-07-20 NOTE — PROGRESS NOTE ADULT - PROVIDER SPECIALTY LIST ADULT
Anesthesia
Electrophysiology
Surgery
CT Surgery
Electrophysiology
CT Surgery
CT Surgery
Surgery
CT Surgery

## 2021-07-20 NOTE — PROGRESS NOTE ADULT - NSICDXPILOT_GEN_ALL_CORE
[place]

## 2021-07-20 NOTE — PROGRESS NOTE ADULT - PROBLEM SELECTOR PLAN 2
lopressor started yesterday for AF, BP stable
SBP 90s overnight requiring albumin  cont lopressor as BP tolerates (needs for AF)
Consider starting Norvasc vs Lopressor if HTN continues.  On only metoprolol preop
resolved s/p disimpaction and + BM by pt  continue bowel regimen
Initially with c/o pain and distention  Transferred back to ICU  General Surgery team consulted  KUB reveals multiple air filled loops, stool in rectum, no free air seen  Pt disimpacted by General Surgery  CT Abd with PO contrast revealed:   1. Moderate amount of stool distending the rectum up to 8 cm; mild perirectal  edema present. -- Possible stool impaction and developing stercoral colitis.  2. Moderate colonic diverticulosis particularly involving the  descending-sigmoid colon. No radiographic signs of associated inflammation.  3. No evidence of small bowel obstruction.  General Surgery planning on further disimpaction.
resolved s/p disimpaction and + BM by pt  continue bowel regimen

## 2021-07-20 NOTE — PROGRESS NOTE ADULT - NUTRITIONAL ASSESSMENT
This patient has been assessed with a concern for Malnutrition and has been determined to have a diagnosis/diagnoses of Severe protein-calorie malnutrition and Underweight (BMI < 19).    This patient is being managed with:   Diet DASH/TLC-  Sodium & Cholesterol Restricted  Entered: Jul 18 2021  8:44AM    

## 2021-07-20 NOTE — PROGRESS NOTE ADULT - PROBLEM SELECTOR PLAN 1
oob, ambulate as tolerated  spo2 stable on RA, encourage Incentive Spirometry, chest PT, deep breathing and coughing  new LBBB post TAVR implant (resolved), f/u AM EKG  tolerating diet, needs BM, received sorbitol yesterday, rectal exam without stool, mild abdominal distention, non tender, +flatus  cont supportive care, d/c home next 1-2 days pending no further AF  ? need for AC, likely will defer due to patients age and high risk of bleeding (will need to discuss with Dr. Jimenez)  d/w team in AM rounds;
OOB, ambulate, chest PT, Incentive Spirometry encouraged  No BB d/t new LBBB  Lozenges for sore throat  Sinus arrhythmia vs AF vs SR with freq PACs: Consider adding BB when possible  Currently BP stable  Asymptomatic  Will continue to monitor closely overnight and if no episodes, will apply Zio MCOT and discharge to home tomorrow.
oob, ambulate as tolerated  spo2 stable on RA, encourage Incentive Spirometry, chest PT, deep breathing and coughing  new LBBB post TAVR implant that resolved, f/u AM EKG  tolerating diet, needs BM, refusing miralax, asking for prune juice  f/u AM labs  likey d/c home today  d/w team in AM rounds;
s/p TAVR on 7/14 with Dr. Jimenez  Currently on Plavix only  Lopressor d/c'd secondary to conversion pauses (AFib-NSR) and post TAVR RBBB  Encourage IS hourly while awake  Ambulate with PT assist daily
s/p TAVR on 7/14 with Dr. Jimenez  Currently on Plavix only  Lopressor d/c'd secondary to conversion pauses (AFib-NSR) and post TAVR RBBB  remains on amio  post-op PAF  Encourage IS hourly while awake  Ambulate with PT assist daily
s/p TAVR on 7/14 with Dr. Jimenez  Currently on Plavix only  Lopressor d/c'd secondary to conversion pauses (AFib-NSR) and post TAVR RBBB  remains on amio load for post-op PAF  Encourage IS hourly while awake  Ambulate with PT assist daily

## 2021-07-20 NOTE — PROGRESS NOTE ADULT - ASSESSMENT
89 y/o F with PMH HTN, valvular disease (aortic and mitral) with preserved LV Function, prior TIA, s/p elective TAVR 7/14/21 with post operative transient RBBB, paroxysmal AF with rapid rates and subsequent conversion pauses <=2.5 seconds. She continues to have episodes of pAF despite amiodarone, but now off beta blockade and no significant conversion pauses noted.   -Continue amiodarone at 200mg daily  -on plavix. consider transition to low dose eliquis, but defer to CTS service given recent intervention.   -Continue to avoid beta blocker therapy to avoid bradycardia. Would transition off amio and back on to low dose metoprolol as outpt if no significant AF burden following post procedure recovery.   -MCOT upon discharge  -Continue telemetry while in house  -Cardiology followup with Dr aBird, can see Dr. Saxena in [facility] Cardiology office as well.

## 2021-07-20 NOTE — PROGRESS NOTE ADULT - PROBLEM SELECTOR PLAN 5
EP Consulted - no need for PPM at this time.  Conversion pauses noted < 3 seconds, RBBB resolved  Lopressor d/c'd, Continue Amio   zio MCOT on discharge x 28 days  Currently in NSR  Pt to f/u with Dr. Baird / Dr. Saxena I 3 villiage as outpt.     DISPO: HOME with RW     Plan to be discussed with Dr. Jimenez and CTS team during AM rounds.
EP Consulted - no need for PPM at this time.  Conversion pauses noted < 3 seconds, RBBB resolved  Lopressor d/c'd, Continue Amio PO  zio MCOT on discharge x 28 days  Currently in NSR
EP Consulted - no need for PPM at this time.  Conversion pauses noted < 3 seconds, RBBB resolved  Lopressor d/c'd, Continue Amio PO  zio MCOT on discharge x 28 days  Currently in NSR

## 2021-07-20 NOTE — PROGRESS NOTE ADULT - PROBLEM SELECTOR PROBLEM 2
Abdominal distention
Essential hypertension
Abdominal distention
Abdominal distention

## 2021-07-20 NOTE — PROGRESS NOTE ADULT - SUBJECTIVE AND OBJECTIVE BOX
Brief Hospital Course:   7/14 admitted through [facility], post deployment mild PVL (mG 2 mmHg & JENNIFER 2.68) with new RBBB, extubated in the OR, TTE done.    7/15 ?AF with aberrancy vs VT, Lopressor.    7/16 Afib with RVR 170s-> Lopressor 5, Mag 2Gm,  NS 500ml bolus Amio 150 mg IVàAmio PO load.  Moved back to ICU for presumed acute abdomen, CT Scan neg for obstruction, +FOS and air filled loops, no free air.   7/18 disimpacted, + BM      Subjective: feels better, no more abd pain. Denies CP, SOB, palpitations, N/V, other c/o.  ROS negative x 10 systems except as noted above      Patient is a 89y old  Female who presents with a chief complaint of dyspnea on exertion (19 Jul 2021 01:56)    HPI:  89 y/o F with PMH HTN, valvular disease(aortic and mitral) who is scheduled for TAVR,  is here accompanied by her daughter , she said she is been experiencing worsening KNUTSON.   Symptoms:        Angina (Class): III       Ischemic Symptoms: KNUTSON  Heart Failure:        Systolic/Diastolic/Combined: NA       NYHA Class (within 2 weeks):   Assessment of LVEF (Must be within 6 months):       EF: 57%       Assessed by: Echo       Date: 2/4/21  Prior Cardiac Interventions (LHC, stents, CABG):       PCI's (Date, Stents, Vessels): NA       CABG (Date, Grafts): NA  Echo (Date, Findings): 2/4/21 normal LV function, mod MR, severe aortic stenosis, mild-mod TR, mild pulmonary HTM  Antianginal Therapies:        Beta Blockers:  Metoprolol 12.5 mg qd       Calcium Channel Blockers:        Long Acting Nitrates:        Ranexa:   Associated Risk Factors:        Cerebrovascular Disease: +TIA       Chronic Lung Disease: N/A       Peripheral Arterial Disease: N/A       Chronic Kidney Disease (if yes, what is GFR): N/A       Uncontrolled Diabetes (if yes, what is HgbA1C or FBS): N/A       Poorly Controlled Hypertension (if yes, what is SBP): N/A       Morbid Obesity (if yes, what is BMI): N/A       History of Recent Ventricular Arrhythmia: N/A       Inability to Ambulate Safely: N/A       Need for Therapeutic Anticoagulation: N/A       Antiplatelet or Contrast Allergy: N/A (08 Jul 2021 11:20)      PAST MEDICAL & SURGICAL HISTORY:  HTN (hypertension)  HLD (hyperlipidemia)  TIA (transient ischemic attack)  Aortic stenosis  Mitral regurgitation  Breast cancer,1979, just surgery, no chemo or radiation.  H/O left mastectomy  H/O cataract extraction    FAMILY HISTORY:  No pertinent family history in first degree relatives      Vitals   ICU Vital Signs Last 24 Hrs  T(C): 37.2 (19 Jul 2021 00:00), Max: 37.2 (18 Jul 2021 15:00)  T(F): 99 (19 Jul 2021 00:00), Max: 99 (19 Jul 2021 00:00)  HR: 57 (19 Jul 2021 04:00) (57 - 79), SB  BP: 123/55 (19 Jul 2021 04:00) (96/45 - 147/65)  BP(mean): 78 (19 Jul 2021 00:00) (65 - 93)  RR: 18 (19 Jul 2021 04:00) (18 - 31)  SpO2: 98% (19 Jul 2021 04:00) (94% - 100%) on room air      I&O's Detail    18 Jul 2021 07:01  -  19 Jul 2021 05:31  --------------------------------------------------------  IN:    Oral Fluid: 1140 mL  Total IN: 1140 mL    OUT:    Voided (mL): 600 mL  Total OUT: 600 mL    Total NET: 540 mL      LABS                        9.9    12.58 )-----------( 119      ( 19 Jul 2021 03:56 )             30.8     07-19    131<L>  |  97<L>  |  20.8<H>  ----------------------------<  109<H>  4.6   |  25.0  |  0.72    Ca    8.6      19 Jul 2021 03:56  Phos  2.6     07-19  Mg     2.3     07-19 7/18 and 7/19/21 CXR (read by me): no significant interval changes when accounting for differences in technique    < from: Xray Chest 1 View- PORTABLE-Routine (07.16.21 @ 05:42) >  Findings: Persistent cardiomegaly. A prosthetic aortic valve is noted. Small bilateral pleural effusions, unchanged. Groundglass changes in the midlung fields bilaterally, unchanged..  Impression: Stable exam without significant change since the previous study..  < end of copied text >      < from: CT Abdomen and Pelvis w/ Oral Cont (07.18.21 @ 01:22) >  FINDINGS:  Pleural spaces: Mild-moderate bilateral pleural effusions right larger than left. Mild lingular, right middle and bilateral lower lobe air space opacity-atelectasis/scarring. TAPVR.  Liver: Normal. No mass.  Gallbladder and bile ducts: Normal. No calcified stones. No ductal dilation.  Pancreas: Nonenhanced pancreas is unremarkable. Cystic lesions within the pancreas seen on prior CT angiogram, not well appreciated on this noncontrast exam.  Spleen: Normal. No splenomegaly.  Adrenal glands: Normal. No mass.  Kidneys and ureters: Ovoid 3.9 cm simple left renal cyst.  Stomach and bowel:  Moderate amount of stool distending the rectum up to 8 cm; mild perirectal edema present. Moderate colonic diverticulosis particularly involving the descending-sigmoid colon. No radiographic signs of associated inflammation. No evidence of small bowel obstruction.  Appendix: Appendix - visualized portions appear normal.  Intraperitoneal space: Unremarkable. No free air. No significant fluid collection.  Vasculature: Chronic atherosclerotic calcification of the vasculature.  Lymph nodes: Unremarkable. No enlarged lymph nodes.  Urinary bladder: Unremarkable as visualized.  Reproductive: Atrophic uterus.  Bones/joints: Scoliosis and degenerative changes of the lumbar spine.  Soft tissues: Unremarkable.  IMPRESSION:  1. Moderate amount of stool distending the rectum up to 8 cm; mild perirectal edema present. --Correlate for stercoral colitis.  2. Moderate colonic diverticulosis particularly involving the descending-sigmoid colon. No radiographic signs of associated inflammation.  3. No evidence of small bowel obstruction.  4. Ovoid 3.9 cm simple left renal cyst. No further workup recommended.  5. Mild-moderate bilateral pleural effusions right larger than left. Mild lingular, right middle and bilateral lower lobe air space opacity-atelectasis/scarring. TAPVR.  This study was preliminary reported by [facility].  --- End of Report ---  < end of copied text >      MEDICATIONS  (STANDING):  aMIOdarone    Tablet 400 milliGRAM(s) Oral every 8 hours  clopidogrel Tablet 75 milliGRAM(s) Oral daily  enoxaparin Injectable 30 milliGRAM(s) SubCutaneous daily  lactulose Syrup 15 Gram(s) Oral every 6 hours  pantoprazole   Suspension 40 milliGRAM(s) Oral daily  polyethylene glycol 3350 17 Gram(s) Oral daily  senna 2 Tablet(s) Oral at bedtime  simvastatin 20 milliGRAM(s) Oral at bedtime  sodium chloride 0.9% lock flush 3 milliLiter(s) IV Push every 8 hours    MEDICATIONS  (PRN):  benzocaine 15 mG/menthol 3.6 mG (Sugar-Free) Lozenge 1 Lozenge Oral four times a day PRN Sore Throat  simethicone 80 milliGRAM(s) Chew two times a day PRN Gas    No Known Allergies      Physical Exam:   Neuro: A+O x 3, non-focal, speech clear and intact  HEENT:  NCAT, PERRL, EOMI. No conjuctival edema or icterus, no thrush.  No ETT or NGT/OGT  Neck:  supple, trachea midline, no tracheostomy  Pulm: right basilar rales otherwise CTA, good air entry, equal bilaterally, no rhonchi/wheezing, no accessory muscle use noted  CV: regular rate, regular rhythm, +S1S2  Abd: soft, NT, ND, + BS  Ext: TEJADA x 4, no edema, no cyanosis or clubbing, distal motor/neuro/circ intact, bilat groins C/D/I/soft/no hematoma, R with dressing.  Skin: warm, dry, well perfused, + scattered ecchymosis      Code Status: full code
Subjective:    Pt feeling well. denies palpitaiton or lightheadeness.   On tele she continues to have brief episodes of AF with rapid rates, but no significant conversion pauses noted (all <2 seconds). Resting HRs in sinus rhythm in 60s bpm.     MEDICATIONS  (STANDING):  aMIOdarone    Tablet 200 milliGRAM(s) Oral daily  clopidogrel Tablet 75 milliGRAM(s) Oral daily  enoxaparin Injectable 30 milliGRAM(s) SubCutaneous daily  lactulose Syrup 15 Gram(s) Oral every 6 hours  pantoprazole   Suspension 40 milliGRAM(s) Oral daily  polyethylene glycol 3350 17 Gram(s) Oral daily  senna 2 Tablet(s) Oral at bedtime  simvastatin 20 milliGRAM(s) Oral at bedtime  sodium chloride 0.9% lock flush 3 milliLiter(s) IV Push every 8 hours    MEDICATIONS  (PRN):  benzocaine 15 mG/menthol 3.6 mG (Sugar-Free) Lozenge 1 Lozenge Oral four times a day PRN Sore Throat  simethicone 80 milliGRAM(s) Chew two times a day PRN Gas      Allergies    No Known Allergies    Intolerances        Vital Signs Last 24 Hrs  T(C): 36.8 (20 Jul 2021 08:25), Max: 36.8 (20 Jul 2021 05:26)  T(F): 98.2 (20 Jul 2021 08:25), Max: 98.2 (20 Jul 2021 05:26)  HR: 70 (20 Jul 2021 08:25) (60 - 130)  BP: 132/69 (20 Jul 2021 08:25) (121/68 - 178/74)  BP(mean): 90 (20 Jul 2021 08:25) (90 - 90)  RR: 18 (20 Jul 2021 08:25) (18 - 18)  SpO2: 97% (20 Jul 2021 08:25) (93% - 100%)    Physical Exam:  Constitutional: NAD, AAOx3  Cardiovascular: +S1S2 RRR  Pulmonary: CTA b/l, unlabored  GI: soft NTND +BS  Extremities: no pedal edema, +distal pulses b/l  Neuro: non focal, TEJADA x4    LABS:                        9.7    12.23 )-----------( 141      ( 20 Jul 2021 06:45 )             29.4     07-20    134<L>  |  99  |  22.1<H>  ----------------------------<  102<H>  5.1   |  26.0  |  0.70    Ca    8.9      20 Jul 2021 06:45  Phos  2.7     07-20  Mg     2.0     07-20            RADIOLOGY & ADDITIONAL TESTS:  < from: TTE Echo Complete w/ Contrast w/ Doppler (07.14.21 @ 17:10) >   1. Technically adequate study.   2. Hyperdynamic global left ventricular systolic function.   3. Left ventricular ejection fraction, by visual estimation, is >75%.   4. Spectral Doppler shows pseudonormal pattern of left ventricular myocardial filling (Grade II diastolic dysfunction).   5. Mild thickening and calcification of the posterior mitral valve leaflet.   6. Mild mitral valve regurgitation.   7. Moderate mitral annular calcification. Mean transmitral gradient at Hr of 80 BPM is 4.3 mmHg.   8. Mild-moderate tricuspid regurgitation.   9. [place] 3 Transfemoral TAVR in the aortic position.  10. Mild pulmonic valve regurgitation.  11.Peak transaortic gradient equals 15.7 mmHg, mean transaortic gradient equals 8.2 mmHg, Prosthetic valve orifice area is 1.3 cm. DVI is 0.42. AT 89 msec.  12. Estimated pulmonary artery systolic pressure is 68.6 mmHg assuming a right atrial pressure of 15 mmHg, which is consistent with severe pulmonary hypertension.  13. Compared to a TTE from 6/21: LVEF is hyperdynamic. There is no a TAVR in aortic position. LAP and RAP are elevated. PASP which was previously mild in severity is now severe.    < end of copied text >  
No evidence of anesthetic complications.  
no acute events overnight. tolerating a diet without emesis. small amount liquid stool. receiving various stool softeners. abdominal pain improved  denies fevers, chills, nausea/vomiting, bloody stools      MEDICATIONS  (STANDING):  aMIOdarone    Tablet   Oral   aMIOdarone    Tablet 400 milliGRAM(s) Oral every 8 hours  clopidogrel Tablet 75 milliGRAM(s) Oral daily  enoxaparin Injectable 30 milliGRAM(s) SubCutaneous daily  lactulose Syrup 15 Gram(s) Oral every 6 hours  pantoprazole   Suspension 40 milliGRAM(s) Oral daily  polyethylene glycol 3350 17 Gram(s) Oral daily  senna 2 Tablet(s) Oral at bedtime  simvastatin 20 milliGRAM(s) Oral at bedtime  sodium chloride 0.9% lock flush 3 milliLiter(s) IV Push every 8 hours    MEDICATIONS  (PRN):  benzocaine 15 mG/menthol 3.6 mG (Sugar-Free) Lozenge 1 Lozenge Oral four times a day PRN Sore Throat  simethicone 80 milliGRAM(s) Chew two times a day PRN Gas      Vital Signs Last 24 Hrs  T(C): 37.2 (19 Jul 2021 00:00), Max: 37.2 (18 Jul 2021 15:00)  T(F): 99 (19 Jul 2021 00:00), Max: 99 (19 Jul 2021 00:00)  HR: 62 (19 Jul 2021 00:00) (62 - 79)  BP: 133/54 (19 Jul 2021 00:00) (96/45 - 152/63)  BP(mean): 78 (19 Jul 2021 00:00) (65 - 94)  RR: 20 (19 Jul 2021 00:00) (18 - 31)  SpO2: 96% (19 Jul 2021 00:00) (94% - 100%)    Physical Exam:  general: no acute distress    Respiratory: Breath Sounds equal & clear to auscultation, no accessory muscle use    Cardiovascular: Regular rate & rhythm, normal S1, S2; no murmurs, gallops or rubs    Gastrointestinal: Soft, non-tender, mildly distedned    Extremities: No peripheral edema, No cyanosis, clubbing     Vascular: Equal and normal pulses: 2+ peripheral pulses throughout    Musculoskeletal: No joint pain, swelling or deformity; no limitation of movement    Skin: No rashes      I&O's Detail    17 Jul 2021 07:01  -  18 Jul 2021 07:00  --------------------------------------------------------  IN:    Oral Fluid: 1420 mL  Total IN: 1420 mL    OUT:  Total OUT: 0 mL    Total NET: 1420 mL      18 Jul 2021 07:01  -  19 Jul 2021 01:58  --------------------------------------------------------  IN:    Oral Fluid: 900 mL  Total IN: 900 mL    OUT:    Voided (mL): 300 mL  Total OUT: 300 mL    Total NET: 600 mL          LABS:                        9.9    15.01 )-----------( 98       ( 18 Jul 2021 06:26 )             30.7     07-18    128<L>  |  93<L>  |  17.6  ----------------------------<  133<H>  3.9   |  22.0  |  0.65    Ca    8.8      18 Jul 2021 04:48  Mg     2.0     07-18    TPro  6.8  /  Alb  3.9  /  TBili  1.0  /  DBili  0.2  /  AST  72<H>  /  ALT  40<H>  /  AlkPhos  109  07-17          RADIOLOGY & ADDITIONAL STUDIES:
HPI/OVERNIGHT EVENTS:  No acute overnight events. Vital signs stable. Patient reports feeling much better this morning. Has been continuing to have liquid bowel movements and passing gas. Abdomen soft. one episode of nausea overnight, treated. Denies vomiting, fever, chills, chest pain, shortness of breath, or any new or concerning symptoms.     MEDICATIONS  (STANDING):  aMIOdarone    Tablet   Oral   aMIOdarone    Tablet 400 milliGRAM(s) Oral every 8 hours  clopidogrel Tablet 75 milliGRAM(s) Oral daily  lactulose Syrup 15 Gram(s) Oral every 6 hours  pantoprazole    Tablet 40 milliGRAM(s) Oral daily  polyethylene glycol 3350 17 Gram(s) Oral daily  simvastatin 20 milliGRAM(s) Oral at bedtime  sodium chloride 0.9% lock flush 3 milliLiter(s) IV Push every 8 hours    MEDICATIONS  (PRN):  benzocaine 15 mG/menthol 3.6 mG (Sugar-Free) Lozenge 1 Lozenge Oral four times a day PRN Sore Throat  simethicone 80 milliGRAM(s) Chew two times a day PRN Gas      Vital Signs Last 24 Hrs  T(C): 36.8 (18 Jul 2021 00:01), Max: 37.2 (17 Jul 2021 20:30)  T(F): 98.3 (18 Jul 2021 00:01), Max: 98.9 (17 Jul 2021 20:30)  HR: 70 (18 Jul 2021 07:00) (64 - 77)  BP: 138/67 (18 Jul 2021 07:00) (101/39 - 152/63)  BP(mean): 89 (18 Jul 2021 07:00) (63 - 94)  RR: 21 (18 Jul 2021 07:00) (17 - 31)  SpO2: 95% (18 Jul 2021 07:00) (93% - 100%)    Constitutional: patient resting comfortably in bed, in no acute distress  HEENT: EOMI, no active drainage or redness  Neck: Full ROM without pain  Respiratory: respirations are unlabored, no accessory muscle use, no conversational dyspnea  Cardiovascular: regular rate & rhythm  Gastrointestinal: Abdomen soft, non-tender, mildly distended. No rebound or guarding.   Neurological: A&O x 3; no gross sensory / motor / coordination deficits  Musculoskeletal: No limitation of movement      I&O's Detail    17 Jul 2021 07:01  -  18 Jul 2021 07:00  --------------------------------------------------------  IN:    Oral Fluid: 1420 mL  Total IN: 1420 mL    OUT:  Total OUT: 0 mL    Total NET: 1420 mL          LABS:                        9.9    15.01 )-----------( 98       ( 18 Jul 2021 06:26 )             30.7     07-18    128<L>  |  93<L>  |  17.6  ----------------------------<  133<H>  3.9   |  22.0  |  0.65    Ca    8.8      18 Jul 2021 04:48  Mg     2.0     07-18    TPro  6.8  /  Alb  3.9  /  TBili  1.0  /  DBili  0.2  /  AST  72<H>  /  ALT  40<H>  /  AlkPhos  109  07-17      IMAGING:   CT A/P  IMPRESSION:  1. Moderate amount of stool distending the rectum up to 8 cm; mild perirectal  edema present. -- Possible stool impaction and developing stercoral colitis.  2. Moderate colonic diverticulosis particularly involving the  descending-sigmoid colon. No radiographic signs of associated inflammation.  3. No evidence of small bowel obstruction.  4. Ovoid 3.9 cm simple left renal cyst. No further workup recommended.  5. Mild-moderate bilateral pleural effusions right larger than left. Mild  lingular, right middle and bilateral lower lobe air space  opacity-atelectasis/scarring. TAPVR.        ******PRELIMINARY REPORT******  ******PRELIMINARY REPORT******      DEVANG DUDLEY M.D.;[facility] RADIOLOGIST    
Pt stable overnight. Briefly back in AF this AM with intermittent RBBB; now back in sinus rhythm w/ 1st deg AVB. No documented pauses - pt was off monitor for transport when she converted to sinus rhythm, but did not have any symptoms associated with conversion. Denies complaint.      EKG: sinus rhythm at 50s bpm w/ 1st degree AVB (MS 240ms), otherwise normal intervals, left axis.     MEDICATIONS  (STANDING):  aMIOdarone    Tablet   Oral   aMIOdarone    Tablet 400 milliGRAM(s) Oral every 8 hours  clopidogrel Tablet 75 milliGRAM(s) Oral daily  enoxaparin Injectable 30 milliGRAM(s) SubCutaneous daily  lactulose Syrup 15 Gram(s) Oral every 6 hours  pantoprazole   Suspension 40 milliGRAM(s) Oral daily  polyethylene glycol 3350 17 Gram(s) Oral daily  senna 2 Tablet(s) Oral at bedtime  simvastatin 20 milliGRAM(s) Oral at bedtime  sodium chloride 0.9% lock flush 3 milliLiter(s) IV Push every 8 hours    MEDICATIONS  (PRN):  benzocaine 15 mG/menthol 3.6 mG (Sugar-Free) Lozenge 1 Lozenge Oral four times a day PRN Sore Throat  simethicone 80 milliGRAM(s) Chew two times a day PRN Gas      Allergies  No Known Allergies    Vital Signs Last 24 Hrs  T(C): 36.8 (19 Jul 2021 10:20), Max: 37.2 (18 Jul 2021 15:00)  T(F): 98.3 (19 Jul 2021 10:20), Max: 99 (19 Jul 2021 00:00)  HR: 54 (19 Jul 2021 10:20) (54 - 79)  BP: 107/62 (19 Jul 2021 10:20) (96/45 - 146/60)  BP(mean): 94 (19 Jul 2021 08:00) (65 - 94)  RR: 18 (19 Jul 2021 10:20) (18 - 31)  SpO2: 97% (19 Jul 2021 10:20) (94% - 100%)    Physical Exam:  Constitutional: NAD, AAOx3  Cardiovascular: +S1S2 RRR  Pulmonary: CTA b/l, unlabored  GI: soft NTND +BS  Extremities: no pedal edema, +distal pulses b/l  Neuro: non focal, TEJADA x4    LABS:                      9.9    12.58 )-----------( 119      ( 19 Jul 2021 03:56 )             30.8     131<L>  |  97<L>  |  20.8<H>  ----------------------------<  109<H>  4.6   |  25.0  |  0.72    Ca    8.6      19 Jul 2021 03:56  Phos  2.6     07-19  Mg     2.3     07-19    TPro  6.8  /  Alb  3.9  /  TBili  1.0  /  DBili  0.2  /  AST  72<H>  /  ALT  40<H>  /  AlkPhos  109  07-17  
Subjective: "I feel ok, I hope to go home tomorrow" NAD denies cp, sob.    Pertinent events of the past 24 hours: Uneventful, recovering as expected    VITAL SIGNS  Vital Signs Last 24 Hrs  T(C): 36.7 (07-15-21 @ 15:30), Max: 37.2 (07-15-21 @ 08:00)  T(F): 98.1 (07-15-21 @ 15:30), Max: 99 (07-15-21 @ 08:00)  HR: 93 (07-15-21 @ 15:30) (70 - 93)  BP: 151/65 (07-15-21 @ 15:30) (118/55 - 162/68)  RR: 20 (07-15-21 @ 15:30) (0 - 35)  SpO2: 93% (07-15-21 @ 15:30) (93% - 98%)  on 2L NC           Telemetry/Alarms:  SR w PACs, AF brief  LVEF: 60    MEDICATIONS  benzocaine 15 mG/menthol 3.6 mG (Sugar-Free) Lozenge 1 Lozenge Oral four times a day PRN  chlorhexidine 0.12% Liquid 5 milliLiter(s) Oral Mucosa two times a day  clopidogrel Tablet 75 milliGRAM(s) Oral daily  oxycodone    5 mG/acetaminophen 325 mG 1 Tablet(s) Oral every 6 hours PRN  oxycodone    5 mG/acetaminophen 325 mG 2 Tablet(s) Oral every 6 hours PRN  pantoprazole    Tablet 40 milliGRAM(s) Oral daily  polyethylene glycol 3350 17 Gram(s) Oral daily  simvastatin 20 milliGRAM(s) Oral at bedtime  sodium chloride 0.9% lock flush 3 milliLiter(s) IV Push every 8 hours  sodium chloride 0.9%. 1000 milliLiter(s) IV Continuous <Continuous>  sodium chloride 0.9%. 1000 milliLiter(s) IV Continuous <Continuous>      PHYSICAL EXAM  General: well nourished, well developed, no acute distress  Neurology: alert and oriented x 3, nonfocal, no gross deficits  Respiratory: diminsihed bases  CV: irregularly irregular, + systolic murmur  Abdomen: soft, nontender, nondistended, positive bowel sounds  Extremities: warm, well perfused. trace edema + DP pulses  Incisions: midline sternal incision, + mepilex, c/d/i. sternum stable.      07-14 @ 07:01  -  07-15 @ 07:00  --------------------------------------------------------  IN: 710 mL / OUT: 645 mL / NET: 65 mL    07-15 @ 07:01  -  07-15 @ 17:59  --------------------------------------------------------  IN: 0 mL / OUT: 235 mL / NET: -235 mL        Weights:  Daily     Daily Weight in k.9 (15 Jul 2021 05:10)  Admit Wt: Drug Dosing Weight  Height (cm): 154.9 (2021 09:53)  Weight (kg): 48.4 (2021 09:53)  BMI (kg/m2): 20.2 (2021 09:53)  BSA (m2): 1.45 (2021 09:53)    All laboratory results, radiology and medications reviewed.    LABS  07-15    135  |  100  |  16.6  ----------------------------<  124<H>  4.9   |  24.0  |  0.63    Ca    9.1      15 Jul 2021 03:11  Phos  4.6     07-15  Mg     2.5     07-15    TPro  6.6  /  Alb  3.9  /  TBili  0.6  /  DBili  x   /  AST  22  /  ALT  13  /  AlkPhos  78                                   9.7    5.42  )-----------( 155      ( 15 Jul 2021 03:11 )             29.5          PT/INR - ( 2021 17:06 )   PT: 14.1 sec;   INR: 1.23 ratio         PTT - ( 2021 17:06 )  PTT:33.5 sec    CAPILLARY BLOOD GLUCOSE               Last CXR: < from: Xray Chest 1 View- PORTABLE-Routine (07.15.21 @ 06:17) >    IMPRESSION:  ET tube removed.    Increased upper lobe lung markings, recommend correlation for possible mild interstitial infiltrate, new.    Platelike atelectasis at the left base, unchanged    --- End of Report ---    < from: 12 Lead ECG (07.15.21 @ 04:05) >    Ventricular Rate 76 BPM    Atrial Rate 76 BPM    P-R Interval 156 ms    QRS Duration 98 ms    Q-T Interval 452 ms    QTC Calculation(Bazett) 508 ms    P Axis 76 degrees    R Axis -20 degrees    T Axis 3 degrees    Diagnosis Line Sinus rhythm with Premature supraventricular complexes  Incomplete right bundle branch block  Prolonged QT  Abnormal ECG    Confirmed by PANCHITO GILLIAM ([id number]) on 7/15/2021 5:10:26 PM    < end of copied text >    < end of copied text >      Last EKG:    PAST MEDICAL & SURGICAL HISTORY:  HTN (hypertension)    HLD (hyperlipidemia)    TIA (transient ischemic attack)    Aortic stenosis    Mitral regurgitation    Breast cancer  , just surgery, no chemo or radiation.    H/O left mastectomy    H/O cataract extraction       
Subjective: c/o constipation, requesting prune juice, denies CP, palpitations, SOB, cough, fever, chills, itchiness/rash, diaphoresis, vision changes, HA, dizziness/lightheadedness, numbness/tingling, abd pain, N/V     ROS: as above    Patient is a 89y old  Female who presents with a chief complaint of "Aortic valve replacement" (08 Jul 2021 11:20)    HPI:  89 y/o F with PMH HTN, valvular disease(aortic and mitral) who is scheduled for TAVR,  is here accompanied by her daughter , she said she is been experiencing worsening KNUTSON.   Symptoms:        Angina (Class): III       Ischemic Symptoms: KNUTSON    Heart Failure:        Systolic/Diastolic/Combined: NA       NYHA Class (within 2 weeks):     Assessment of LVEF (Must be within 6 months):       EF: 57%       Assessed by: Echo       Date: 2/4/21    Prior Cardiac Interventions (LHC, stents, CABG):       PCI's (Date, Stents, Vessels): NA       CABG (Date, Grafts): NA    Echo (Date, Findings): 2/4/21 normal LV function, mod MR, severe aortic stenosis, mild-mod TR, mild pulmonary HTM    Antianginal Therapies:        Beta Blockers:  Metoprolol 12.5 mg qd       Calcium Channel Blockers:        Long Acting Nitrates:        Ranexa:     Associated Risk Factors:        Cerebrovascular Disease: +TIA       Chronic Lung Disease: N/A       Peripheral Arterial Disease: N/A       Chronic Kidney Disease (if yes, what is GFR): N/A       Uncontrolled Diabetes (if yes, what is HgbA1C or FBS): N/A       Poorly Controlled Hypertension (if yes, what is SBP): N/A       Morbid Obesity (if yes, what is BMI): N/A       History of Recent Ventricular Arrhythmia: N/A       Inability to Ambulate Safely: N/A       Need for Therapeutic Anticoagulation: N/A       Antiplatelet or Contrast Allergy: N/A (08 Jul 2021 11:20)    PAST MEDICAL & SURGICAL HISTORY:  HTN (hypertension)  HLD (hyperlipidemia)  TIA (transient ischemic attack)  Aortic stenosis  Mitral regurgitation  Breast cancer  1979, just surgery, no chemo or radiation.  H/O left mastectomy  H/O cataract extraction    FAMILY HISTORY:  No pertinent family history in first degree relatives    Vitals   ICU Vital Signs Last 24 Hrs  T(C): 36.9 (15 Jul 2021 19:44), Max: 37.2 (15 Jul 2021 08:00)  T(F): 98.4 (15 Jul 2021 19:44), Max: 99 (15 Jul 2021 08:00)  HR: 114 (15 Jul 2021 19:44) (73 - 114)  BP: 126/63 (15 Jul 2021 19:44) (126/63 - 162/68)  BP(mean): 97 (15 Jul 2021 09:00) (90 - 98)  ABP: 133/33 (15 Jul 2021 10:00) (118/29 - 158/49)  ABP(mean): 65 (15 Jul 2021 10:00) (57 - 80)  RR: 20 (15 Jul 2021 19:44) (19 - 35)  SpO2: 93% (15 Jul 2021 19:44) (93% - 96%)    ABG - ( 14 Jul 2021 16:30 )  pH, Arterial: 7.390 pH, Blood: x     /  pCO2: 41    /  pO2: 105   / HCO3: 25    / Base Excess: -0.2  /  SaO2: 99.6      I&O's Detail    14 Jul 2021 07:01  -  15 Jul 2021 07:00  --------------------------------------------------------  IN:    Albumin 5%  - 250 mL: 250 mL    IV PiggyBack: 100 mL    Oral Fluid: 200 mL    sodium chloride 0.9%: 160 mL  Total IN: 710 mL    OUT:    Indwelling Catheter - Urethral (mL): 645 mL  Total OUT: 645 mL  Total NET: 65 mL    15 Jul 2021 07:01  -  16 Jul 2021 04:52  --------------------------------------------------------  IN:    Oral Fluid: 60 mL  Total IN: 60 mL    OUT:    Indwelling Catheter - Urethral (mL): 135 mL    Voided (mL): 100 mL  Total OUT: 235 mL  Total NET: -175 mL    LABS                        9.7    5.42  )-----------( 155      ( 15 Jul 2021 03:11 )             29.5     07-15    135  |  100  |  16.6  ----------------------------<  124<H>  4.9   |  24.0  |  0.63    Ca    9.1      15 Jul 2021 03:11  Phos  4.6     07-15  Mg     2.5     07-15    TPro  6.6  /  Alb  3.9  /  TBili  0.6  /  DBili  x   /  AST  22  /  ALT  13  /  AlkPhos  78  07-14    LIVER FUNCTIONS - ( 14 Jul 2021 17:06 )  Alb: 3.9 g/dL / Pro: 6.6 g/dL / ALK PHOS: 78 U/L / ALT: 13 U/L / AST: 22 U/L / GGT: x         PT/INR - ( 14 Jul 2021 17:06 )   PT: 14.1 sec;   INR: 1.23 ratio    PTT - ( 14 Jul 2021 17:06 )  PTT:33.5 sec    MEDICATIONS  (STANDING):  chlorhexidine 0.12% Liquid 5 milliLiter(s) Oral Mucosa two times a day  clopidogrel Tablet 75 milliGRAM(s) Oral daily  metoprolol tartrate 25 milliGRAM(s) Oral two times a day  pantoprazole    Tablet 40 milliGRAM(s) Oral daily  polyethylene glycol 3350 17 Gram(s) Oral daily  simvastatin 20 milliGRAM(s) Oral at bedtime  sodium chloride 0.9% lock flush 3 milliLiter(s) IV Push every 8 hours    MEDICATIONS  (PRN):  benzocaine 15 mG/menthol 3.6 mG (Sugar-Free) Lozenge 1 Lozenge Oral four times a day PRN Sore Throat  oxycodone    5 mG/acetaminophen 325 mG 1 Tablet(s) Oral every 6 hours PRN Mild Pain (1 - 3)  oxycodone    5 mG/acetaminophen 325 mG 2 Tablet(s) Oral every 6 hours PRN Moderate Pain (4 - 6)    Allergies:  No Known Allergies    Physical Exam:  Gen: NAD,  Neuro: A&Ox3, non-focal, speech clear and intact  CV: S1S2 RRR, no murmurs/rubs  Pulm: CTA b/l, no wheezing/rales/rhonchi, no use of accessory muscles  Abd:+ BS non tender, softly distended  Ext: no LE edema, b/l groins soft, R groin dsg C/D/I  Vascular:2+ DP/radial pulses b/l        
POD 3 s/p percutaneous TF-TAVR via [facility] (arvin), postop course c/b AF RVR requiring lopressor and amio    Subjective: c/o constipation, tiny BM yesterday, denies CP, palpitations, SOB, cough, fever, chills, itchiness/rash, diaphoresis, vision changes, HA, dizziness/lightheadedness, numbness/tingling, abd pain, N/V     T(C): 36.8 (07-17-21 @ 00:05), Max: 36.9 (07-16-21 @ 05:05)  HR: 117 (07-17-21 @ 00:05) (76 - 155)  BP: 95/59 (07-17-21 @ 00:05) (94/58 - 152/78)  RR: 18 (07-17-21 @ 00:05) (16 - 18)  SpO2: 96% (07-17-21 @ 00:05) (92% - 96%)    07-16    134<L>  |  99  |  23.0<H>  ----------------------------<  110<H>  4.5   |  28.0  |  0.71    Ca    9.3      16 Jul 2021 06:22  Phos  4.6     07-15  Mg     2.1     07-16                                 10.2   10.97 )-----------( 132      ( 16 Jul 2021 06:22 )             30.7     I&O's Detail    15 Jul 2021 07:01  -  16 Jul 2021 07:00  --------------------------------------------------------  IN:    Oral Fluid: 60 mL  Total IN: 60 mL    OUT:    Indwelling Catheter - Urethral (mL): 135 mL    Voided (mL): 100 mL  Total OUT: 235 mL    Total NET: -175 mL      16 Jul 2021 07:01  -  17 Jul 2021 02:09  --------------------------------------------------------  IN:    Oral Fluid: 720 mL  Total IN: 720 mL    OUT:    Voided (mL): 600 mL  Total OUT: 600 mL    Total NET: 120 mL    Drug Dosing Weight  Height (cm): 154.9 (14 Jul 2021 09:53)  Weight (kg): 48.4 (14 Jul 2021 09:53)  BMI (kg/m2): 20.2 (14 Jul 2021 09:53)  BSA (m2): 1.45 (14 Jul 2021 09:53)    MEDICATIONS  (STANDING):  aMIOdarone    Tablet   Oral   aMIOdarone    Tablet 400 milliGRAM(s) Oral every 8 hours  clopidogrel Tablet 75 milliGRAM(s) Oral daily  metoprolol tartrate 25 milliGRAM(s) Oral two times a day  pantoprazole    Tablet 40 milliGRAM(s) Oral daily  polyethylene glycol 3350 17 Gram(s) Oral daily  simvastatin 20 milliGRAM(s) Oral at bedtime  sodium chloride 0.9% lock flush 3 milliLiter(s) IV Push every 8 hours    MEDICATIONS  (PRN):  benzocaine 15 mG/menthol 3.6 mG (Sugar-Free) Lozenge 1 Lozenge Oral four times a day PRN Sore Throat  oxycodone    5 mG/acetaminophen 325 mG 1 Tablet(s) Oral every 6 hours PRN Mild Pain (1 - 3)  oxycodone    5 mG/acetaminophen 325 mG 2 Tablet(s) Oral every 6 hours PRN Moderate Pain (4 - 6)    Physical Exam:  Gen: NAD,  Neuro: A&Ox3, non-focal, speech clear and intact  CV: S1S2 irregular  Pulm: CTA b/l, no wheezing/rales/rhonchi, no use of accessory muscles  Abd:+ BS non tender, softly distended  Ext: no LE edema, b/l groins soft, R groin dsg C/D/I  Vascular:2+ DP/radial pulses b/l    
Subjective:  89yo F resting comfortable, abdominal pain resolving.      HPI:  89 y/o F with PMH HTN, valvular disease(aortic and mitral) who is scheduled for TAVR,  is here accompanied by her daughter , she said she is been experiencing worsening KNUTSON.   Symptoms:        Angina (Class): III       Ischemic Symptoms: KNUTSON    Heart Failure:        Systolic/Diastolic/Combined: NA       NYHA Class (within 2 weeks):     Assessment of LVEF (Must be within 6 months):       EF: 57%       Assessed by: Echo       Date: 21    Prior Cardiac Interventions (LHC, stents, CABG):       PCI's (Date, Stents, Vessels): NA       CABG (Date, Grafts): NA    Echo (Date, Findings): 21 normal LV function, mod MR, severe aortic stenosis, mild-mod TR, mild pulmonary HTM    Antianginal Therapies:        Beta Blockers:  Metoprolol 12.5 mg qd       Calcium Channel Blockers:        Long Acting Nitrates:        Ranexa:     Associated Risk Factors:        Cerebrovascular Disease: +TIA       Chronic Lung Disease: N/A       Peripheral Arterial Disease: N/A       Chronic Kidney Disease (if yes, what is GFR): N/A       Uncontrolled Diabetes (if yes, what is HgbA1C or FBS): N/A       Poorly Controlled Hypertension (if yes, what is SBP): N/A       Morbid Obesity (if yes, what is BMI): N/A       History of Recent Ventricular Arrhythmia: N/A       Inability to Ambulate Safely: N/A       Need for Therapeutic Anticoagulation: N/A       Antiplatelet or Contrast Allergy: N/A (2021 11:20)          PAST MEDICAL & SURGICAL HISTORY:  HTN (hypertension)    HLD (hyperlipidemia)    TIA (transient ischemic attack)    Aortic stenosis    Mitral regurgitation    Breast cancer  , just surgery, no chemo or radiation.    H/O left mastectomy    H/O cataract extraction            MEDICATIONS  (STANDING):  aMIOdarone    Tablet   Oral   aMIOdarone    Tablet 400 milliGRAM(s) Oral every 8 hours  clopidogrel Tablet 75 milliGRAM(s) Oral daily  lactulose Syrup 15 Gram(s) Oral every 6 hours  pantoprazole    Tablet 40 milliGRAM(s) Oral daily  polyethylene glycol 3350 17 Gram(s) Oral daily  simvastatin 20 milliGRAM(s) Oral at bedtime  sodium chloride 0.9% lock flush 3 milliLiter(s) IV Push every 8 hours    MEDICATIONS  (PRN):  benzocaine 15 mG/menthol 3.6 mG (Sugar-Free) Lozenge 1 Lozenge Oral four times a day PRN Sore Throat  simethicone 80 milliGRAM(s) Chew two times a day PRN Gas          Allergies    No Known Allergies    Intolerances          WEIGHTS:  Daily     Daily Weight in k.8 (2021 05:16)  Admit Wt: Drug Dosing Weight  Height (cm): 154.9 (2021 09:53)  Weight (kg): 48.4 (2021 09:53)  BMI (kg/m2): 20.2 (2021 09:53)  BSA (m2): 1.45 (2021 09:53)  I&O's Summary    2021 07:01  -  2021 07:00  --------------------------------------------------------  IN: 720 mL / OUT: 600 mL / NET: 120 mL    2021 07:01  -  2021 02:09  --------------------------------------------------------  IN: 1320 mL / OUT: 0 mL / NET: 1320 mL        VITAL SIGNS:  ICU Vital Signs Last 24 Hrs  T(C): 37.2 (2021 20:30), Max: 37.2 (2021 20:30)  T(F): 98.9 (2021 20:30), Max: 98.9 (2021 20:30)  HR: 69 (2021 02:00) (60 - 77)  BP: 152/63 (2021 02:00) (96/51 - 152/63)  BP(mean): 90 (2021 02:00) (63 - 91)  ABP: --  ABP(mean): --  RR: 20 (2021 02:00) (17 - 30)  SpO2: 100% (2021 02:00) (93% - 100%)        All laboratory results, radiology and medications reviewed.    LABS:      130<L>  |  97<L>  |  21.0<H>  ----------------------------<  126<H>  4.2   |  22.0  |  0.67    Ca    9.1      2021 19:59  Mg     2.2         TPro  6.8  /  Alb  3.9  /  TBili  1.0  /  DBili  0.2  /  AST  72<H>  /  ALT  40<H>  /  AlkPhos  109                                   10.4   17.33 )-----------( 120      ( 2021 19:59 )             31.8            Bilirubin Total, Serum: 1.0 mg/dL ( @ 19:59)  Bilirubin Direct, Serum: 0.2 mg/dL ( @ 19:59)          CAPILLARY BLOOD GLUCOSE      POCT Blood Glucose.: 125 mg/dL (2021 03:56)             PHYSICAL EXAM:  General:  no acute distress  Neurology:  alert and oriented X 4, nonfocal, no gross deficits  Respiratory:  clear to auscultation bilaterally  CV:  regular rate and rhythm, normal S1 S2  Abdomen:  slightly firm, currently nontender, distended, positive bowel sounds  Extremities:  warm, well perfused, no edema +DP pulses                  
SUBJECTIVE:  Pt OOB to the Chair, Pt upset this evening, pt states, " I feel like a bouncing ball they keep bouncing me around"  Patient denies acute pain with radiating or aggravating factors.  She denies chest pain, shortness of breath, palpitations, headache, dizziness, nausea, or vomiting.      Overnight events:  none    PAST MEDICAL & SURGICAL HISTORY:  HTN (hypertension)    HLD (hyperlipidemia)    TIA (transient ischemic attack)    Aortic stenosis    Mitral regurgitation    Breast cancer  1979, just surgery, no chemo or radiation.    H/O left mastectomy    H/O cataract extraction        MEDICATIONS  aMIOdarone    Tablet 200 milliGRAM(s) Oral daily  benzocaine 15 mG/menthol 3.6 mG (Sugar-Free) Lozenge 1 Lozenge Oral four times a day PRN  clopidogrel Tablet 75 milliGRAM(s) Oral daily  enoxaparin Injectable 30 milliGRAM(s) SubCutaneous daily  lactulose Syrup 15 Gram(s) Oral every 6 hours  pantoprazole   Suspension 40 milliGRAM(s) Oral daily  polyethylene glycol 3350 17 Gram(s) Oral daily  senna 2 Tablet(s) Oral at bedtime  simethicone 80 milliGRAM(s) Chew two times a day PRN  simvastatin 20 milliGRAM(s) Oral at bedtime  sodium chloride 0.9% lock flush 3 milliLiter(s) IV Push every 8 hours  MEDICATIONS  (PRN):  benzocaine 15 mG/menthol 3.6 mG (Sugar-Free) Lozenge 1 Lozenge Oral four times a day PRN Sore Throat  simethicone 80 milliGRAM(s) Chew two times a day PRN Gas      Daily     Daily                               9.9    12.58 )-----------( 119      ( 19 Jul 2021 03:56 )             30.8   07-19    131<L>  |  97<L>  |  20.8<H>  ----------------------------<  109<H>  4.6   |  25.0  |  0.72    Ca    8.6      19 Jul 2021 03:56  Phos  2.6     07-19  Mg     2.3     07-19              Objective:  T(C): 36.7 (07-19-21 @ 21:01), Max: 36.8 (07-19-21 @ 10:20)  HR: 76 (07-19-21 @ 21:01) (54 - 130)  BP: 178/74 (07-19-21 @ 21:01) (107/62 - 178/74)  RR: 18 (07-19-21 @ 21:01) (18 - 20)  SpO2: 94% (07-19-21 @ 21:01) (94% - 100%)  Wt(kg): --CAPILLARY BLOOD GLUCOSE      I&O's Summary    18 Jul 2021 07:01  -  19 Jul 2021 07:00  --------------------------------------------------------  IN: 1140 mL / OUT: 600 mL / NET: 540 mL    19 Jul 2021 07:01  -  20 Jul 2021 00:52  --------------------------------------------------------  IN: 360 mL / OUT: 400 mL / NET: -40 mL        Physical Exam:   Neuro: A+O x 3, non-focal, speech clear and intact  HEENT:  NCAT, PERRL, EOMI. No conjuctival edema or icterus, no thrush.    Neck:  supple, trachea midline, no tracheostomy  Pulm:  CTA, good air entry diminished b/l bases  , equal bilaterally, no rhonchi/wheezing, no accessory muscle use noted  CV: RRR,+S1S2  Abd: soft, NT, ND, + BS  Ext: TEJADA x 4, no edema, no cyanosis or clubbing, distal motor/neuro/circ intact, bilat groins C/D/I/soft/no hematoma, R with dressing.  Skin: warm, dry, well perfused, + scattered ecchymosis    Imaging:  CXR:  < from: Xray Chest 1 View- PORTABLE-Routine (07.17.21 @ 05:42) >  FINDINGS:    The lungs display a LEFT greater than RIGHT basilar pleural effusions LEFT effusion and/or airspace disease obscuring diaphragm contour.  Bilateral perihilar diffuseairspace disease.  No gross vascular congestion.  No pneumothorax.    The  heart is enlarged in transverse diameter. No hilar mass.  Status post cardiac valve replacement.     Visualized osseous structures are intact.    IMPRESSION:   LEFT greater than RIGHT pleural effusions.  Bilateral perihilar diffuse airspace disease..  Cardiomegaly.  Lateral or bilateral decubitus radiographs recommended to confirm posterior lung base pathology.    --- End of Report ---    < end of copied text >      ECG:  < from: 12 Lead ECG (07.19.21 @ 10:12) >    Ventricular Rate 54 BPM    Atrial Rate 54 BPM    P-R Interval 248 ms    QRS Duration 102 ms    Q-T Interval 500 ms    QTC Calculation(Bazett) 474 ms    P Axis 16 degrees    R Axis -34 degrees    T Axis 15 degrees    Diagnosis Line Sinus bradycardiawith 1st degree A-V block  Left axis deviation  Incomplete right bundle branch block  Abnormal ECG    < end of copied text >

## 2021-07-21 ENCOUNTER — NON-APPOINTMENT (OUTPATIENT)
Age: 86
End: 2021-07-21

## 2021-07-21 DIAGNOSIS — R13.10 DYSPHAGIA, UNSPECIFIED: ICD-10-CM

## 2021-07-22 ENCOUNTER — TRANSCRIPTION ENCOUNTER (OUTPATIENT)
Age: 86
End: 2021-07-22

## 2021-07-22 ENCOUNTER — APPOINTMENT (OUTPATIENT)
Dept: CARE COORDINATION | Facility: HOME HEALTH | Age: 86
End: 2021-07-22

## 2021-07-22 ENCOUNTER — EMERGENCY (EMERGENCY)
Facility: HOSPITAL | Age: 86
LOS: 1 days | Discharge: HOME | End: 2021-07-22
Attending: EMERGENCY MEDICINE
Payer: MEDICARE

## 2021-07-22 VITALS
WEIGHT: 109.13 LBS | RESPIRATION RATE: 20 BRPM | TEMPERATURE: 98 F | HEIGHT: 61 IN | HEART RATE: 77 BPM | DIASTOLIC BLOOD PRESSURE: 74 MMHG | OXYGEN SATURATION: 94 % | SYSTOLIC BLOOD PRESSURE: 190 MMHG

## 2021-07-22 VITALS
RESPIRATION RATE: 16 BRPM | OXYGEN SATURATION: 96 % | DIASTOLIC BLOOD PRESSURE: 78 MMHG | HEART RATE: 78 BPM | SYSTOLIC BLOOD PRESSURE: 128 MMHG | HEIGHT: 60 IN

## 2021-07-22 VITALS
SYSTOLIC BLOOD PRESSURE: 181 MMHG | OXYGEN SATURATION: 95 % | DIASTOLIC BLOOD PRESSURE: 73 MMHG | HEART RATE: 84 BPM | RESPIRATION RATE: 18 BRPM

## 2021-07-22 DIAGNOSIS — I82.442 ACUTE EMBOLISM AND THROMBOSIS OF LEFT TIBIAL VEIN: ICD-10-CM

## 2021-07-22 DIAGNOSIS — Z98.49 CATARACT EXTRACTION STATUS, UNSPECIFIED EYE: Chronic | ICD-10-CM

## 2021-07-22 DIAGNOSIS — Z90.12 ACQUIRED ABSENCE OF LEFT BREAST AND NIPPLE: Chronic | ICD-10-CM

## 2021-07-22 LAB
ALBUMIN SERPL ELPH-MCNC: 3.7 G/DL — SIGNIFICANT CHANGE UP (ref 3.3–5.2)
ALP SERPL-CCNC: 99 U/L — SIGNIFICANT CHANGE UP (ref 40–120)
ALT FLD-CCNC: 24 U/L — SIGNIFICANT CHANGE UP
ANION GAP SERPL CALC-SCNC: 11 MMOL/L — SIGNIFICANT CHANGE UP (ref 5–17)
AST SERPL-CCNC: 34 U/L — HIGH
BASOPHILS # BLD AUTO: 0.05 K/UL — SIGNIFICANT CHANGE UP (ref 0–0.2)
BASOPHILS NFR BLD AUTO: 0.4 % — SIGNIFICANT CHANGE UP (ref 0–2)
BILIRUB SERPL-MCNC: 1 MG/DL — SIGNIFICANT CHANGE UP (ref 0.4–2)
BUN SERPL-MCNC: 19 MG/DL — SIGNIFICANT CHANGE UP (ref 8–20)
CALCIUM SERPL-MCNC: 9.2 MG/DL — SIGNIFICANT CHANGE UP (ref 8.6–10.2)
CHLORIDE SERPL-SCNC: 100 MMOL/L — SIGNIFICANT CHANGE UP (ref 98–107)
CO2 SERPL-SCNC: 24 MMOL/L — SIGNIFICANT CHANGE UP (ref 22–29)
CREAT SERPL-MCNC: 0.7 MG/DL — SIGNIFICANT CHANGE UP (ref 0.5–1.3)
EOSINOPHIL # BLD AUTO: 0.07 K/UL — SIGNIFICANT CHANGE UP (ref 0–0.5)
EOSINOPHIL NFR BLD AUTO: 0.6 % — SIGNIFICANT CHANGE UP (ref 0–6)
GLUCOSE SERPL-MCNC: 108 MG/DL — HIGH (ref 70–99)
HCT VFR BLD CALC: 30.2 % — LOW (ref 34.5–45)
HGB BLD-MCNC: 10 G/DL — LOW (ref 11.5–15.5)
IMM GRANULOCYTES NFR BLD AUTO: 1.3 % — SIGNIFICANT CHANGE UP (ref 0–1.5)
LYMPHOCYTES # BLD AUTO: 1.43 K/UL — SIGNIFICANT CHANGE UP (ref 1–3.3)
LYMPHOCYTES # BLD AUTO: 11.8 % — LOW (ref 13–44)
MCHC RBC-ENTMCNC: 28.7 PG — SIGNIFICANT CHANGE UP (ref 27–34)
MCHC RBC-ENTMCNC: 33.1 GM/DL — SIGNIFICANT CHANGE UP (ref 32–36)
MCV RBC AUTO: 86.5 FL — SIGNIFICANT CHANGE UP (ref 80–100)
MONOCYTES # BLD AUTO: 1.42 K/UL — HIGH (ref 0–0.9)
MONOCYTES NFR BLD AUTO: 11.7 % — SIGNIFICANT CHANGE UP (ref 2–14)
NEUTROPHILS # BLD AUTO: 9.01 K/UL — HIGH (ref 1.8–7.4)
NEUTROPHILS NFR BLD AUTO: 74.2 % — SIGNIFICANT CHANGE UP (ref 43–77)
PLATELET # BLD AUTO: 185 K/UL — SIGNIFICANT CHANGE UP (ref 150–400)
POTASSIUM SERPL-MCNC: 4.2 MMOL/L — SIGNIFICANT CHANGE UP (ref 3.5–5.3)
POTASSIUM SERPL-SCNC: 4.2 MMOL/L — SIGNIFICANT CHANGE UP (ref 3.5–5.3)
PROT SERPL-MCNC: 6.7 G/DL — SIGNIFICANT CHANGE UP (ref 6.6–8.7)
RBC # BLD: 3.49 M/UL — LOW (ref 3.8–5.2)
RBC # FLD: 14.2 % — SIGNIFICANT CHANGE UP (ref 10.3–14.5)
SODIUM SERPL-SCNC: 135 MMOL/L — SIGNIFICANT CHANGE UP (ref 135–145)
WBC # BLD: 12.14 K/UL — HIGH (ref 3.8–10.5)
WBC # FLD AUTO: 12.14 K/UL — HIGH (ref 3.8–10.5)

## 2021-07-22 PROCEDURE — 71045 X-RAY EXAM CHEST 1 VIEW: CPT | Mod: 26

## 2021-07-22 PROCEDURE — 99284 EMERGENCY DEPT VISIT MOD MDM: CPT | Mod: GC

## 2021-07-22 PROCEDURE — 80053 COMPREHEN METABOLIC PANEL: CPT

## 2021-07-22 PROCEDURE — 71045 X-RAY EXAM CHEST 1 VIEW: CPT

## 2021-07-22 PROCEDURE — 93005 ELECTROCARDIOGRAM TRACING: CPT

## 2021-07-22 PROCEDURE — 99282 EMERGENCY DEPT VISIT SF MDM: CPT

## 2021-07-22 PROCEDURE — 36415 COLL VENOUS BLD VENIPUNCTURE: CPT

## 2021-07-22 PROCEDURE — 99284 EMERGENCY DEPT VISIT MOD MDM: CPT | Mod: 25

## 2021-07-22 PROCEDURE — 93010 ELECTROCARDIOGRAM REPORT: CPT

## 2021-07-22 PROCEDURE — 85025 COMPLETE CBC W/AUTO DIFF WBC: CPT

## 2021-07-22 RX ORDER — APIXABAN 2.5 MG/1
10 TABLET, FILM COATED ORAL ONCE
Refills: 0 | Status: COMPLETED | OUTPATIENT
Start: 2021-07-22 | End: 2021-07-22

## 2021-07-22 RX ORDER — APIXABAN 2.5 MG/1
1 TABLET, FILM COATED ORAL
Qty: 35 | Refills: 0
Start: 2021-07-22 | End: 2021-08-04

## 2021-07-22 RX ORDER — METOPROLOL TARTRATE 25 MG/1
25 TABLET, FILM COATED ORAL
Refills: 0 | Status: DISCONTINUED | COMMUNITY
End: 2021-07-22

## 2021-07-22 RX ORDER — APIXABAN 2.5 MG/1
1 TABLET, FILM COATED ORAL
Qty: 28 | Refills: 0
Start: 2021-07-22 | End: 2021-08-04

## 2021-07-22 RX ADMIN — APIXABAN 10 MILLIGRAM(S): 2.5 TABLET, FILM COATED ORAL at 23:32

## 2021-07-22 NOTE — ED ADULT TRIAGE NOTE - CHIEF COMPLAINT QUOTE
patient brought in by daughter for +DVT in LLE, was d/c home from [facility] after valve replacement by dr peres on 7/14. patient c/o LLE pain, swelling and difficulty ambulating. denies SOB, palpitations, chest pain.

## 2021-07-22 NOTE — ED PROVIDER NOTE - PATIENT PORTAL LINK FT
You can access the FollowMyHealth Patient Portal offered by [facility] by registering at the following website: [email or web address]. By joining [employer]’s FollowMyHealth portal, you will also be able to view your health information using other applications (apps) compatible with our system.

## 2021-07-22 NOTE — ED PROVIDER NOTE - NSFOLLOWUPINSTRUCTIONS_ED_ALL_ED_FT
Continue apixaban (Eliquis) as prescribed  Follow up with your surgeon and vascular surgery within 2 weeks  Return to the ER with any new, worsening or persistent symptoms.

## 2021-07-22 NOTE — ED PROVIDER NOTE - CARE PROVIDERS DIRECT ADDRESSES
,DirectAddress_Unknown,[email or web address]

## 2021-07-22 NOTE — ED PROVIDER NOTE - CARE PROVIDER_API CALL
Yasmany Jimenez)  Surgery; Thoracic and Cardiac Surgery  [address] [place] [address]  Phone: [phone]  Fax: [phone]  Follow Up Time:     ManvarSingh, Pallavi B (MD)  Vascular Surgery  [address], [address]st [place] [address]  Phone: [phone]  Fax: [phone]  Follow Up Time: Urgent

## 2021-07-22 NOTE — PROGRESS NOTE ADULT - PROBLEM SELECTOR PLAN 1
Patient arrived with outpatient report and disc from Zakiya Bess showing +DVT of LLE.   Symptomatic with Left LE pain and swelling.   Vascular Consult pending in chart. Spoke with Dr. Valle earlier who recommended treating with oral anticoagulation since patient is symptomatic.   Labs remain stable when compared to previous.  Patient to be started on Eliquis. Spoke with Pharmacy. Due to patient's age, weight, CrCl, Eliquis 2.5mg BID is the recommended dose for DVT tx.  Patient not comfortable swallowing pills. Confirmed with pharmacy that Eliquis can be crushed.   Stop taking Aspirin as patient is already on Plavix for recent stents.   Plan discussed / reviewed with CT Surgery attending Dr. Jimenez.   Patient to follow up within 2 weeks with CTS and Vascular as an outpatient. Patient arrived with outpatient report and disc from Zakiya Bess showing +DVT of LLE.   Symptomatic with Left LE pain and swelling.   Vascular Consult pending in chart. Spoke with Dr. Valle earlier who recommended treating with oral anticoagulation since patient is symptomatic.   Labs remain stable when compared to previous.  Patient to be started on Eliquis based on patient's age, weight, CrCl. Patient not comfortable swallowing pills. Confirmed with pharmacy that Eliquis can be crushed.   Stop taking Aspirin as patient is already on Plavix for recent stents.   Plan discussed / reviewed with CT Surgery attending Dr. Jimenez.   Patient to follow up within 2 weeks with CTS and Vascular as an outpatient.

## 2021-07-22 NOTE — PROGRESS NOTE ADULT - SUBJECTIVE AND OBJECTIVE BOX
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 Now s/p TF-TAVR via [facility] 7/14 with Dr. Jimenez. Postop course notable for new RBBB, PAF (now SB/SR, not discharged on oral antocoagulation due to age), constipation (resolved s/p disimpaction and + BM). Patient originally discharged home 7/20/21.     Now patient told to come to the ED earlier today 7/22/21 after outpatient Left LE Doppler showed a mid left lower leg posterior tibial vein demonstrating presence of intraluminal thrombus associated with noncompressibility and occlusion to flow consistent with Deep Venous Thrombus.     PAST MEDICAL & SURGICAL HISTORY:  HTN (hypertension)  HLD (hyperlipidemia)  TIA (transient ischemic attack)  Aortic stenosis  Mitral regurgitation  Breast cancer 1979, just surgery, no chemo or radiation.  H/O left mastectomy  H/O cataract extraction    FAMILY HISTORY:  No pertinent family history in first degree relatives    Subjective: Patient lying on stretcher in ED in no acute distress. States she has a "pulsing pain" that comes and goes in her left calf. +Left calf mild swelling. Has been ambulating with a walker at home since her TAVR procedure. Denies fevers, chills, lightheadedness, dizziness, HA, CP, palpitations, SOB, cough, abdominal pain, N/V, diarrhea, numbness/tingling in extremities, or any other acute complaints. ROS negative x 10 systems except as noted above, Patient's daughter and niece are at the bedside throughout exam.     MEDICATIONS  (STANDING):  No active medications in chart.     Allergies: No Known Allergies    Vitals   T(C): 36.8 (22 Jul 2021 19:04), Max: 36.8 (22 Jul 2021 19:04)  T(F): 98.2 (22 Jul 2021 19:04), Max: 98.2 (22 Jul 2021 19:04)  HR: 77 (22 Jul 2021 19:04) (77 - 77)  BP: 190/74 (22 Jul 2021 19:04) (190/74 - 190/74)  RR: 20 (22 Jul 2021 19:04) (20 - 20)  SpO2: 94% (22 Jul 2021 19:04) (94% - 94%)    Physical Exam  Neuro: A+O x 3, non-focal, speech clear and intact  HEENT:  NCAT, PERRL, EOMI. No conjuctival edema or icterus, no thrush.    Neck:  Supple, trachea midline  Pulm: +Diminished BSs at bases b/l. No accessory muscle use noted.  CV: regular rate, regular rhythm, +S1S2, no murmur or rub noted  Abd: soft, NT, ND, + BS  Ext: +Scattered healing ecchymosis b/l UEs, TEJADA x 4, +mild left calf tenderness to palpation and mild swelling of LLe, no pain or swelling of RLE, no cyanosis or clubbing, overall distal motor/neuro/circ intact  Skin: warm, dry, perfused    LABS                        10.0   12.14 )-----------( 185      ( 22 Jul 2021 20:52 )             30.2     07-22    135  |  100  |  19.0  ----------------------------<  108<H>  4.2   |  24.0  |  0.70    Ca    9.2      22 Jul 2021 20:52    TPro  6.7  /  Alb  3.7  /  TBili  1.0  /  DBili  x   /  AST  34<H>  /  ALT  24  /  AlkPhos  99  07-22

## 2021-07-22 NOTE — ED PROVIDER NOTE - ATTENDING CONTRIBUTION TO CARE
Post operative DVT seen on outside imaging no chest pain, dyspnea, hypoxia suggestive PE, will start eliquis at treatment dosing, to follow up with vascular outpatient.

## 2021-07-22 NOTE — ED ADULT NURSE NOTE - CHIEF COMPLAINT QUOTE
patient brought in by daughter for +DVT in LLE, was d/c home from [facility] after valve replacement by dr peres on 7/14. patient c/o LLE pain, swelling and difficulty ambulating. denies SOB, palpitations, chest pain.

## 2021-07-22 NOTE — ED PROVIDER NOTE - CLINICAL SUMMARY MEDICAL DECISION MAKING FREE TEXT BOX
89F, PMH HTN, mod MR/TR, diastolic CHF, CAD, TIA, PAD, breast CA,   s/p TF-TAVR via [facility] 7/14 with Dr. Jimenez discharged to ED after left leg pain and swelling followed by outpatient US today showed LLE DVT.  CT called who requested vascular consult.   EKG labs and imaging performed to evaluate the patient.

## 2021-07-22 NOTE — ED ADULT NURSE NOTE - INTERVENTIONS DEFINITIONS
[place] to call system/Instruct patient to call for assistance/Stretcher in lowest position, wheels locked, appropriate side rails in place/Monitor gait and stability/Monitor for mental status changes and reorient to person, place, and time/Review medications for side effects contributing to fall risk/Reinforce activity limits and safety measures with patient and family

## 2021-07-22 NOTE — REVIEW OF SYSTEMS
[Fever] : no fever [Chills] : no chills [Feeling Poorly] : not feeling poorly [Feeling Tired] : feeling tired [Heart Rate Is Slow] : the heart rate was not slow [Heart Rate Is Fast] : the heart rate was not fast [Chest Pain] : no chest pain [Palpitations] : no palpitations [Leg Claudication] : intermittent leg claudication [Lower Ext Edema] : lower extremity edema [Shortness Of Breath] : no shortness of breath [Wheezing] : no wheezing [Cough] : no cough [SOB on Exertion] : shortness of breath during exertion [Abdominal Pain] : no abdominal pain [Vomiting] : no vomiting [Constipation] : no constipation [Diarrhea] : no diarrhea [Suicidal] : not suicidal [Sleep Disturbances] : no sleep disturbances [Anxiety] : anxiety [Depression] : no depression [Negative] : Neurological [FreeTextEntry7] : excessive flatus

## 2021-07-22 NOTE — PROGRESS NOTE ADULT - ASSESSMENT
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 Now s/p TF-TAVR via [facility] 7/14 with Dr. Jimenez. Postop course notable for new RBBB, PAF (now SB/SR), constipation (resolved s/p disimpaction and + BM). Patient originally discharged home 7/20/21.     Now patient told to come to the ED earlier today 7/22/21 after outpatient Left LE Doppler showed a mid left lower leg posterior tibial vein demonstrating presence of intraluminal thrombus associated with noncompressibility and occlusion to flow consistent with Deep Venous Thrombus.

## 2021-07-22 NOTE — REASON FOR VISIT
[Follow-Up: _____] : a [unfilled] follow-up visit [Family Member] : family member [FreeTextEntry1] : FOLLOW YOUR HEART- Transitional Care Management Program- [facility]\par \par

## 2021-07-22 NOTE — ED ADULT NURSE NOTE - OBJECTIVE STATEMENT
Pt received A&Ox4 c/o LLE pain associated with swelling to L ankle +1 edema. + pain to LLE upon palpation. As per daughter, + DVT to LLE. Daughter states RN came to house and noticed swelling present. Prompted to get doppler this afternoon. B/l pedal pulses present. Pt had valve replacement on 7/14/21 @ [facility] and dc'd 2 days ago. Pt placed on CM in NSR w/. Pt denies any CP, SOB, NVD,  symptoms. Respirations even & unlabored. NAD present. Pt has pink band applied to L arm. Pt cleaned and given new diaper. Pt also placed on female external urinary catheter for further urination. Pt aware and verbalized understanding need for prima-fit catheter.

## 2021-07-22 NOTE — HISTORY OF PRESENT ILLNESS
[FreeTextEntry1] : 89F, PMH severe AS, HTN, mod MR/TR,  Chronic diastolic CHF(NYHA class III), \par CAD, HLD,  mild to moderate pulmonary HTN, TIA, PAD ( proximal subclavian \par stenosis, breast CA (s/p Left Mastectomy 1979 CTA, stage 2 kidney disease, \par mutiple pancreatic lesions (Likely IPMN patient aware), Scoliosis, cataract \par surgery, who has been experiencing worsening SOB. Now s/p TF-TAVR via [facility] 7/14 \par with Dr. Jimenez. Postop course notable for new RBBB, PAF (now SB/SR), \par constipation (resolved s/p disimpaction and + BM). \par  Pt remained hemodynamically stable and discharged home with support of dtr and family as well as home care services and [facility] team. Initial [facility] visit completed in home. \par \par CC: "I"m doing ok, my legs are bothering me especially this one (left leg) and I just had a bad dream I guess"

## 2021-07-22 NOTE — ED ADULT NURSE NOTE - PMH
Aortic stenosis    Breast cancer  1979, just surgery, no chemo or radiation.  HLD (hyperlipidemia)    HTN (hypertension)    Mitral regurgitation    TIA (transient ischemic attack)

## 2021-07-22 NOTE — ED CLERICAL - NS ED CLERK NOTE PRE-ARRIVAL INFORMATION; ADDITIONAL PRE-ARRIVAL INFORMATION
This patient is enrolled in the Follow Your Heart program and has undergone a cardiac surgery procedure and has active care navigation. This patient can be followed up by the care navigation team within 24 hours. To arrange close follow-up or to obtain additional clinical information about this patient, please call the contact number above.     Please call the cardiac surgery team once patient is registered at [phone] for consultation PRIOR to disposition decision.  The patient recently underwent a cardiac surgery procedure and the team can assist in acute medical management.

## 2021-07-29 PROBLEM — Z01.818 PRE-OP TESTING: Status: RESOLVED | Noted: 2021-04-27 | Resolved: 2021-07-29

## 2021-07-29 PROBLEM — I35.0 AORTIC STENOSIS, SEVERE: Status: ACTIVE | Noted: 2021-03-17

## 2021-07-29 PROBLEM — Z95.2 S/P TAVR (TRANSCATHETER AORTIC VALVE REPLACEMENT): Status: ACTIVE | Noted: 2021-07-29

## 2021-07-30 ENCOUNTER — RESULT REVIEW (OUTPATIENT)
Age: 86
End: 2021-07-30

## 2021-07-30 ENCOUNTER — APPOINTMENT (OUTPATIENT)
Dept: CARDIOTHORACIC SURGERY | Facility: CLINIC | Age: 86
End: 2021-07-30
Payer: MEDICARE

## 2021-07-30 ENCOUNTER — OUTPATIENT (OUTPATIENT)
Dept: OUTPATIENT SERVICES | Facility: HOSPITAL | Age: 86
LOS: 1 days | End: 2021-07-30
Payer: MEDICARE

## 2021-07-30 VITALS
RESPIRATION RATE: 16 BRPM | WEIGHT: 101 LBS | DIASTOLIC BLOOD PRESSURE: 64 MMHG | BODY MASS INDEX: 19.83 KG/M2 | HEIGHT: 60 IN | SYSTOLIC BLOOD PRESSURE: 136 MMHG | OXYGEN SATURATION: 100 % | HEART RATE: 94 BPM

## 2021-07-30 DIAGNOSIS — Z95.2 PRESENCE OF PROSTHETIC HEART VALVE: ICD-10-CM

## 2021-07-30 DIAGNOSIS — J90 PLEURAL EFFUSION, NOT ELSEWHERE CLASSIFIED: ICD-10-CM

## 2021-07-30 DIAGNOSIS — Z98.49 CATARACT EXTRACTION STATUS, UNSPECIFIED EYE: Chronic | ICD-10-CM

## 2021-07-30 DIAGNOSIS — Z90.12 ACQUIRED ABSENCE OF LEFT BREAST AND NIPPLE: Chronic | ICD-10-CM

## 2021-07-30 DIAGNOSIS — I35.0 NONRHEUMATIC AORTIC (VALVE) STENOSIS: ICD-10-CM

## 2021-07-30 PROCEDURE — 71046 X-RAY EXAM CHEST 2 VIEWS: CPT | Mod: 26

## 2021-07-30 PROCEDURE — 99212 OFFICE O/P EST SF 10 MIN: CPT

## 2021-07-30 PROCEDURE — 71046 X-RAY EXAM CHEST 2 VIEWS: CPT

## 2021-08-02 ENCOUNTER — APPOINTMENT (OUTPATIENT)
Dept: VASCULAR SURGERY | Facility: CLINIC | Age: 86
End: 2021-08-02
Payer: MEDICARE

## 2021-08-02 VITALS
OXYGEN SATURATION: 100 % | SYSTOLIC BLOOD PRESSURE: 163 MMHG | WEIGHT: 101 LBS | DIASTOLIC BLOOD PRESSURE: 64 MMHG | TEMPERATURE: 97.6 F | HEART RATE: 66 BPM | HEIGHT: 60 IN | BODY MASS INDEX: 19.83 KG/M2

## 2021-08-02 DIAGNOSIS — I82.442 ACUTE EMBOLISM AND THROMBOSIS OF LEFT TIBIAL VEIN: ICD-10-CM

## 2021-08-02 DIAGNOSIS — I82.599 CHRONIC EMBOLISM AND THROMBOSIS OF OTHER SPECIFIED DEEP VEIN OF UNSPECIFIED LOWER EXTREMITY: ICD-10-CM

## 2021-08-02 PROCEDURE — 99203 OFFICE O/P NEW LOW 30 MIN: CPT

## 2021-08-02 PROCEDURE — 93971 EXTREMITY STUDY: CPT

## 2021-08-02 NOTE — HISTORY OF PRESENT ILLNESS
[FreeTextEntry1] : 89 yo F with history of recent TAVR presenting for evaluation of L posterior tibial vein thrombus diagnosed on venous duplex from [facility] on 7/22/21. Postop course uneventful. She c/o left calf swelling and slight discomfort in calf muscle. Started on Eliquis 2.5mg BID by CTS service and referred for follow up duplex.\par Currently denies pain or discomfort in left LE. Doing well. Ambulating with rolling walker. Compliant with Plavix and Eliquis daily.

## 2021-08-02 NOTE — PHYSICAL EXAM
[Normal Rate and Rhythm] : normal rate and rhythm [2+] : left 2+ [Ankle Swelling (On Exam)] : present [Ankle Swelling Bilaterally] : bilaterally  [Ankle Swelling On The Right] : mild [Varicose Veins Of Lower Extremities] : not present [] : not present [Abdomen Tenderness] : ~T ~M No abdominal tenderness [No Rash or Lesion] : No rash or lesion [Alert] : alert [Oriented to Person] : oriented to person [Oriented to Place] : oriented to place [Oriented to Time] : oriented to time [Calm] : calm [de-identified] : NAD [de-identified] : supple, no masses [de-identified] : unlabored [de-identified] : FROM of all 4 extremities\par

## 2021-08-02 NOTE — ASSESSMENT
[FreeTextEntry1] : 89 yo F with history of recent TAVR presenting for evaluation of L posterior tibial vein thrombus diagnosed on venous duplex from [facility] on 7/22/21.\par \par Venous duplex in office today evaluate for propagation demonstrates no evidence of PT vein thrombus, however subacute, nonocclusive soleal vein thrombus is appreciated [Arterial/Venous Disease] : arterial/venous disease [Medication Management] : medication management

## 2021-08-03 ENCOUNTER — TRANSCRIPTION ENCOUNTER (OUTPATIENT)
Age: 86
End: 2021-08-03

## 2021-08-04 ENCOUNTER — NON-APPOINTMENT (OUTPATIENT)
Age: 86
End: 2021-08-04

## 2021-08-13 RX ORDER — APIXABAN 5 MG (74)
5 KIT ORAL
Refills: 0 | Status: DISCONTINUED | COMMUNITY
End: 2021-08-13

## 2021-08-13 RX ORDER — PANTOPRAZOLE SODIUM 40 MG/1
40 GRANULE, DELAYED RELEASE ORAL
Qty: 30 | Refills: 0 | Status: DISCONTINUED | COMMUNITY
Start: 2021-07-21 | End: 2021-08-13

## 2021-08-18 ENCOUNTER — TRANSCRIPTION ENCOUNTER (OUTPATIENT)
Age: 86
End: 2021-08-18

## 2021-09-28 ENCOUNTER — APPOINTMENT (OUTPATIENT)
Dept: DISASTER EMERGENCY | Facility: CLINIC | Age: 86
End: 2021-09-28

## 2021-10-03 ENCOUNTER — APPOINTMENT (OUTPATIENT)
Dept: DISASTER EMERGENCY | Facility: CLINIC | Age: 86
End: 2021-10-03

## 2021-10-03 ENCOUNTER — LABORATORY RESULT (OUTPATIENT)
Age: 86
End: 2021-10-03

## 2021-10-06 ENCOUNTER — OUTPATIENT (OUTPATIENT)
Dept: OUTPATIENT SERVICES | Facility: HOSPITAL | Age: 86
LOS: 1 days | End: 2021-10-06
Payer: MEDICARE

## 2021-10-06 ENCOUNTER — TRANSCRIPTION ENCOUNTER (OUTPATIENT)
Age: 86
End: 2021-10-06

## 2021-10-06 DIAGNOSIS — Z98.49 CATARACT EXTRACTION STATUS, UNSPECIFIED EYE: Chronic | ICD-10-CM

## 2021-10-06 DIAGNOSIS — Z90.12 ACQUIRED ABSENCE OF LEFT BREAST AND NIPPLE: Chronic | ICD-10-CM

## 2021-10-07 ENCOUNTER — TRANSCRIPTION ENCOUNTER (OUTPATIENT)
Age: 86
End: 2021-10-07

## 2021-10-07 PROCEDURE — 80061 LIPID PANEL: CPT

## 2021-10-07 PROCEDURE — 92978 ENDOLUMINL IVUS OCT C 1ST: CPT | Mod: LD

## 2021-10-07 PROCEDURE — C1724: CPT

## 2021-10-07 PROCEDURE — C1889: CPT

## 2021-10-07 PROCEDURE — 86900 BLOOD TYPING SEROLOGIC ABO: CPT

## 2021-10-07 PROCEDURE — C1769: CPT

## 2021-10-07 PROCEDURE — C1725: CPT

## 2021-10-07 PROCEDURE — 99153 MOD SED SAME PHYS/QHP EA: CPT

## 2021-10-07 PROCEDURE — C1760: CPT

## 2021-10-07 PROCEDURE — 86850 RBC ANTIBODY SCREEN: CPT

## 2021-10-07 PROCEDURE — 83880 ASSAY OF NATRIURETIC PEPTIDE: CPT

## 2021-10-07 PROCEDURE — C1887: CPT

## 2021-10-07 PROCEDURE — 80053 COMPREHEN METABOLIC PANEL: CPT

## 2021-10-07 PROCEDURE — 83735 ASSAY OF MAGNESIUM: CPT

## 2021-10-07 PROCEDURE — 36415 COLL VENOUS BLD VENIPUNCTURE: CPT

## 2021-10-07 PROCEDURE — C1894: CPT

## 2021-10-07 PROCEDURE — 99152 MOD SED SAME PHYS/QHP 5/>YRS: CPT

## 2021-10-07 PROCEDURE — 85027 COMPLETE CBC AUTOMATED: CPT

## 2021-10-07 PROCEDURE — 93005 ELECTROCARDIOGRAM TRACING: CPT

## 2021-10-07 PROCEDURE — 86901 BLOOD TYPING SEROLOGIC RH(D): CPT

## 2021-10-07 PROCEDURE — 85025 COMPLETE CBC W/AUTO DIFF WBC: CPT

## 2021-10-07 PROCEDURE — C1874: CPT

## 2021-10-07 PROCEDURE — C9602: CPT | Mod: LD

## 2021-10-07 PROCEDURE — C1753: CPT

## 2021-10-07 RX ORDER — CLOPIDOGREL BISULFATE 75 MG/1
1 TABLET, FILM COATED ORAL
Qty: 0 | Refills: 0 | DISCHARGE

## 2021-10-07 RX ORDER — SIMVASTATIN 20 MG/1
1 TABLET, FILM COATED ORAL
Qty: 0 | Refills: 0 | DISCHARGE

## 2021-10-07 RX ORDER — METOPROLOL TARTRATE 50 MG
1 TABLET ORAL
Qty: 0 | Refills: 0 | DISCHARGE

## 2021-10-12 DIAGNOSIS — R06.02 SHORTNESS OF BREATH: ICD-10-CM

## 2022-03-02 ENCOUNTER — NON-APPOINTMENT (OUTPATIENT)
Age: 87
End: 2022-03-02

## 2022-03-03 ENCOUNTER — NON-APPOINTMENT (OUTPATIENT)
Age: 87
End: 2022-03-03

## 2022-03-10 ENCOUNTER — APPOINTMENT (OUTPATIENT)
Dept: PULMONOLOGY | Facility: CLINIC | Age: 87
End: 2022-03-10
Payer: MEDICARE

## 2022-03-10 VITALS
OXYGEN SATURATION: 96 % | BODY MASS INDEX: 18.4 KG/M2 | HEART RATE: 76 BPM | DIASTOLIC BLOOD PRESSURE: 72 MMHG | SYSTOLIC BLOOD PRESSURE: 180 MMHG | HEIGHT: 62 IN | TEMPERATURE: 97.6 F | WEIGHT: 100 LBS

## 2022-03-10 PROCEDURE — 99204 OFFICE O/P NEW MOD 45 MIN: CPT

## 2022-03-10 RX ORDER — CLOPIDOGREL BISULFATE 75 MG/1
75 TABLET, FILM COATED ORAL
Refills: 0 | Status: DISCONTINUED | COMMUNITY
End: 2022-03-10

## 2022-03-10 RX ORDER — ASPIRIN 81 MG/1
81 TABLET, COATED ORAL DAILY
Refills: 0 | Status: ACTIVE | COMMUNITY
Start: 2022-03-10

## 2022-03-10 RX ORDER — PNV NO.95/FERROUS FUM/FOLIC AC 28MG-0.8MG
TABLET ORAL
Refills: 0 | Status: DISCONTINUED | COMMUNITY
End: 2022-03-10

## 2022-03-10 RX ORDER — AMIODARONE HYDROCHLORIDE 200 MG/1
200 TABLET ORAL DAILY
Qty: 30 | Refills: 0 | Status: DISCONTINUED | COMMUNITY
Start: 2021-07-21 | End: 2022-03-10

## 2022-03-10 RX ORDER — IRBESARTAN 75 MG/1
75 TABLET ORAL
Refills: 0 | Status: ACTIVE | COMMUNITY

## 2022-03-10 RX ORDER — SIMVASTATIN 10 MG/1
10 TABLET, FILM COATED ORAL
Refills: 0 | Status: DISCONTINUED | COMMUNITY
End: 2022-03-10

## 2022-03-10 RX ORDER — AMLODIPINE BESYLATE 2.5 MG/1
2.5 TABLET ORAL
Refills: 0 | Status: DISCONTINUED | COMMUNITY
End: 2022-03-10

## 2022-03-10 NOTE — PHYSICAL EXAM
[No Acute Distress] : no acute distress [Normal Oropharynx] : normal oropharynx [Normal Appearance] : normal appearance [No Neck Mass] : no neck mass [Normal S1, S2] : normal s1, s2 [No Murmurs] : no murmurs [No Resp Distress] : no resp distress [Clear to Auscultation Bilaterally] : clear to auscultation bilaterally [No Abnormalities] : no abnormalities [Benign] : benign [Normal Gait] : normal gait [No Clubbing] : no clubbing [No Cyanosis] : no cyanosis [No Edema] : no edema [FROM] : FROM [Normal Color/ Pigmentation] : normal color/ pigmentation [No Focal Deficits] : no focal deficits [Oriented x3] : oriented x3 [Normal Affect] : normal affect [TextBox_54] : Irregular rhythm and at this time seems possibly bradycardic

## 2022-03-10 NOTE — REASON FOR VISIT
[Initial] : an initial visit [Family Member] : family member [TextBox_44] : initial visit, DVT, valve repacemment, stent, pt. states SOB

## 2022-03-10 NOTE — HISTORY OF PRESENT ILLNESS
[Never] : never [TextBox_4] : 89 female no hx tobacco\par presents today because of inc lethargy and occ dyspnea \par dyspnea occurs upon arising and with activity  no pain\par Hx aortic valve replacement tavr July 2021 and stent oct 2021 and pt does not think any improvement\par precipitating factors no change with weather or temperature\par no new environment changes\par Dx covid Oct 2021 and treated monoclonal ab\par +vaccine no prior hx lung disease

## 2022-03-10 NOTE — DISCUSSION/SUMMARY
[FreeTextEntry1] : Ms. Ramirez has increasing shortness of breath.  It is very nonspecific.  She denies any wheezing chest pain or chest tightness.  She had Covid in the fall 2021 and also recently had a TAVR and cardiac stent.  On exam I noted her heart had an irregular rhythm (seem like possible bigeminy) and her rate was approximately 50.  I am concerned that the metoprolol strength might be too great for her and this is leading to some of her overall lethargy and shortness of breath.  I have asked her and her daughter to call Dr. Jayce Baird the cardiologist to discuss alteration of this medication.  We will obtain pulmonary function test to fully define the patient's physiology and a chest x-ray to define the anatomy.  A chest x-ray done last summer revealed pleural effusions.  The patient and her daughter understand and agree with this plan of care.\par The patient understands and agrees with plan of care.\par Today's office visit encompassed 46  minutes. I conducted an extensive history ,physical exam and reviewed diagnosis and treatment options  including diagnostic tests,radiologic studies including cat scans  and the use of prescription medication.

## 2022-04-06 ENCOUNTER — APPOINTMENT (OUTPATIENT)
Dept: PULMONOLOGY | Facility: CLINIC | Age: 87
End: 2022-04-06
Payer: MEDICARE

## 2022-04-06 PROCEDURE — 94729 DIFFUSING CAPACITY: CPT

## 2022-04-06 PROCEDURE — 94010 BREATHING CAPACITY TEST: CPT

## 2022-04-06 PROCEDURE — 94727 GAS DIL/WSHOT DETER LNG VOL: CPT

## 2022-04-07 ENCOUNTER — APPOINTMENT (OUTPATIENT)
Dept: PULMONOLOGY | Facility: CLINIC | Age: 87
End: 2022-04-07
Payer: MEDICARE

## 2022-04-07 VITALS
HEART RATE: 106 BPM | TEMPERATURE: 97.9 F | DIASTOLIC BLOOD PRESSURE: 60 MMHG | BODY MASS INDEX: 18.29 KG/M2 | SYSTOLIC BLOOD PRESSURE: 112 MMHG | OXYGEN SATURATION: 96 % | WEIGHT: 100 LBS

## 2022-04-07 DIAGNOSIS — R06.00 DYSPNEA, UNSPECIFIED: ICD-10-CM

## 2022-04-07 PROCEDURE — 99214 OFFICE O/P EST MOD 30 MIN: CPT

## 2022-04-07 RX ORDER — METOPROLOL SUCCINATE 50 MG/1
50 TABLET, EXTENDED RELEASE ORAL DAILY
Refills: 0 | Status: DISCONTINUED | COMMUNITY
Start: 2022-03-10 | End: 2022-04-07

## 2022-04-07 RX ORDER — PANTOPRAZOLE 40 MG/1
40 TABLET, DELAYED RELEASE ORAL
Refills: 0 | Status: ACTIVE | COMMUNITY

## 2022-04-07 RX ORDER — AMLODIPINE BESYLATE 2.5 MG/1
2.5 TABLET ORAL
Refills: 0 | Status: ACTIVE | COMMUNITY

## 2022-04-07 NOTE — PROCEDURE
[FreeTextEntry1] : Chest x-ray performed April 5, 2022 is normal.\par Spirometry performed 4/6/2022 is normal as well.

## 2022-04-07 NOTE — HISTORY OF PRESENT ILLNESS
[Never] : never [TextBox_4] : 89 female for fu re dyspnea\par seen by cardiology and BB changed\par today feels tachycardia \par for cardiology fu 1 week\par no cough no dyspnea  no chest pain no tightness \par full activity\par cxr and spirometry normal\par occ foggy brain and dyspnea  ?long giorgi menaid

## 2022-04-07 NOTE — DISCUSSION/SUMMARY
[FreeTextEntry1] : Ms. Ramirez has intermittent shortness of breath.  Her symptoms are very nonspecific.  She does feel her heart is beating fast and her pulse today is 106.  Her beta-blockers were changed by Dr. Baird and she will be seeing him within 1 week to evaluate further treatment.  The does not seem to be any primary pulmonary issue at this time.  Is possible this is all long-haul COVID symptoms.  I favor no further therapy and will reevaluate the patient in approximately 3 months.  The patient and her daughter understand and agree with this plan of care.\par The patient understands and agrees with plan of care.\par Today's office visit encompassed 32 minutes. I conducted an extensive history ,physical exam and reviewed diagnosis and treatment options  including diagnostic tests,radiologic studies including  cat scans  and the use of prescription medication.

## 2022-04-07 NOTE — REASON FOR VISIT
[Follow-Up] : a follow-up visit [Shortness of Breath] : shortness of breath [Family Member] : family member [TextBox_44] : 3 weeks. Chest xray and PFT done. Today pt complains of SOB on exertion. Denies any coughing, chest pain or wheezing.

## 2022-07-05 ENCOUNTER — APPOINTMENT (OUTPATIENT)
Dept: PULMONOLOGY | Facility: CLINIC | Age: 87
End: 2022-07-05

## 2022-07-05 VITALS
SYSTOLIC BLOOD PRESSURE: 150 MMHG | TEMPERATURE: 97.1 F | HEIGHT: 62 IN | BODY MASS INDEX: 18.4 KG/M2 | WEIGHT: 100 LBS | HEART RATE: 63 BPM | DIASTOLIC BLOOD PRESSURE: 50 MMHG | OXYGEN SATURATION: 92 %

## 2022-07-05 DIAGNOSIS — U09.9 POST COVID-19 CONDITION, UNSPECIFIED: ICD-10-CM

## 2022-07-05 PROCEDURE — 99213 OFFICE O/P EST LOW 20 MIN: CPT

## 2022-07-05 RX ORDER — PRAVASTATIN SODIUM 20 MG/1
20 TABLET ORAL
Qty: 90 | Refills: 0 | Status: ACTIVE | COMMUNITY
Start: 2022-04-11

## 2022-07-05 RX ORDER — IRBESARTAN 150 MG/1
150 TABLET ORAL
Qty: 45 | Refills: 0 | Status: ACTIVE | COMMUNITY
Start: 2022-05-09

## 2022-07-05 NOTE — REASON FOR VISIT
[Follow-Up] : a follow-up visit [Shortness of Breath] : shortness of breath [Other: _____] : [unfilled] [TextBox_44] : 3 months,

## 2022-07-05 NOTE — DISCUSSION/SUMMARY
[FreeTextEntry1] : Ms. Ramirez denies any pulmonary symptoms.  Her main complaint is leg weakness status post COVID.  At this time I do not think there is any further pulmonary issue to address and she will call if any problems arise.  The patient understands and agrees with this plan of care.\par The patient understands and agrees with plan of care.\par Today's office visit encompassed 32 minutes. I conducted an extensive history ,physical exam and reviewed diagnosis and treatment options  including diagnostic tests,radiologic studies including  cat scans  and the use of prescription medication.

## 2022-07-05 NOTE — HISTORY OF PRESENT ILLNESS
[Never] : never [TextBox_4] : 89 female hx cardiac disease and long haul covid\par today feel good able to walk minimal dyspnea no chest pain no cough\par +pnds \par no nite awakening

## 2022-08-31 NOTE — PHYSICAL THERAPY INITIAL EVALUATION ADULT - NAME OF CLINICIAN
Pt called to report concern about a rash (red, slightly raised spots) at her ankle extending down to her mid foot. She reported having a history of heat rashes and is currently in [place] on vacation, on her feet all day. She has moisturizers she is using on the dry skin where the rash is, and she is continuing her wound treatment, reporting the wound is looking improved. She was advised to continue her tetragrip but keep track of whether the rash is growing or staying stable. This was advised because she reported her foot and leg have been swelling more than usual on this vacation (improved by elevation and ice), and there is a concern that the wound will deteriorate from the increased swelling if the compression is not worn while on her feet all day. She was informed clinic NP advised this as well as using a lotion, not ointment, only if the rash area is dry, due to a concern that sweat (from the heat) and an ointment might result in too much moisture. Pt expressed understanding and intention to call with any further concerns.  
nurse hany

## 2023-03-23 NOTE — DISCHARGE NOTE NURSING/CASE MANAGEMENT/SOCIAL WORK - NSDCPECAREGIVERED_GEN_ALL_CORE
Finasteride Pregnancy And Lactation Text: This medication is absolutely contraindicated during pregnancy. It is unknown if it is excreted in breast milk. Yes

## 2023-07-16 NOTE — PROGRESS NOTE ADULT - NEGATIVE GENERAL SYMPTOMS
Problem: Plan of Care - These are the overarching goals to be used throughout the patient stay.    Goal: Optimal Comfort and Wellbeing  7/16/2023 1532 by Rose Conrad RN  Outcome: Met  7/16/2023 1301 by Rose Conrad RN  Outcome: Progressing     Problem: Plan of Care - These are the overarching goals to be used throughout the patient stay.    Goal: Readiness for Transition of Care  7/16/2023 1532 by Rose Conrad RN  Outcome: Met  7/16/2023 1301 by Rose Conrad RN  Outcome: Progressing   Goal Outcome Evaluation    Pt ok to discharge home today.  Went over discharge instructions with patient.  Rx sent to [facility].  Pt verbalized understanding of discharge orders.  Pt had home O2 tank to discharge home.  Pt taken to door by aide to be transported home by family.     no fever

## 2023-08-14 NOTE — H&P PST ADULT - CARDIOVASCULAR SYMPTOMS
Patient requesting refill: Multiple Medications   Last OV: 8/3/22  Next OV: 8/23/23  Last refill: 8/3/22  Disp: 90 tablet Refill: 3     Can be refilled per protocol.   
Per PATIENT there were no changes to med, dosage, sig or quantity.   The medication(s) set up as pending and waiting for your approval.  Preferred Pharmacy has been set up and verified        Pt stated he is almost out of it and he will be going of town.   
dyspnea on exertion